# Patient Record
Sex: FEMALE | Race: WHITE | NOT HISPANIC OR LATINO | Employment: OTHER | URBAN - METROPOLITAN AREA
[De-identification: names, ages, dates, MRNs, and addresses within clinical notes are randomized per-mention and may not be internally consistent; named-entity substitution may affect disease eponyms.]

---

## 2017-02-02 ENCOUNTER — RECORDS - HEALTHEAST (OUTPATIENT)
Dept: LAB | Facility: CLINIC | Age: 62
End: 2017-02-02

## 2017-02-02 LAB
CHOLEST SERPL-MCNC: 296 MG/DL
FASTING STATUS PATIENT QL REPORTED: ABNORMAL
HDLC SERPL-MCNC: 65 MG/DL
LDLC SERPL CALC-MCNC: 184 MG/DL
TRIGL SERPL-MCNC: 235 MG/DL

## 2017-02-07 LAB
BKR LAB AP ABNORMAL BLEEDING: NO
BKR LAB AP BIRTH CONTROL/HORMONES: NORMAL
BKR LAB AP CERVICAL APPEARANCE: NORMAL
BKR LAB AP GYN ADEQUACY: NORMAL
BKR LAB AP GYN INTERPRETATION: NORMAL
BKR LAB AP GYN OTHER FINDINGS: NORMAL
BKR LAB AP HPV REFLEX: NORMAL
BKR LAB AP LMP: NORMAL
BKR LAB AP PATIENT STATUS: NORMAL
BKR LAB AP PREVIOUS ABNORMAL: NORMAL
BKR LAB AP PREVIOUS NORMAL: 2011
HIGH RISK?: NO
HPV INTERPRETATION - HISTORICAL: NORMAL
HPV INTERPRETER - HISTORICAL: NORMAL
PATH REPORT.COMMENTS IMP SPEC: NORMAL
RESULT FLAG (HE HISTORICAL CONVERSION): NORMAL

## 2017-10-03 ENCOUNTER — RECORDS - HEALTHEAST (OUTPATIENT)
Dept: LAB | Facility: CLINIC | Age: 62
End: 2017-10-03

## 2017-10-03 LAB
CHOLEST SERPL-MCNC: 290 MG/DL
FASTING STATUS PATIENT QL REPORTED: ABNORMAL
HDLC SERPL-MCNC: 69 MG/DL
LDLC SERPL CALC-MCNC: 181 MG/DL
TRIGL SERPL-MCNC: 199 MG/DL

## 2018-01-23 ENCOUNTER — RECORDS - HEALTHEAST (OUTPATIENT)
Dept: LAB | Facility: CLINIC | Age: 63
End: 2018-01-23

## 2018-01-23 LAB
ALBUMIN SERPL-MCNC: 3.9 G/DL (ref 3.5–5)
ALP SERPL-CCNC: 81 U/L (ref 45–120)
ALT SERPL W P-5'-P-CCNC: 49 U/L (ref 0–45)
ANION GAP SERPL CALCULATED.3IONS-SCNC: 8 MMOL/L (ref 5–18)
AST SERPL W P-5'-P-CCNC: 36 U/L (ref 0–40)
BILIRUB SERPL-MCNC: 0.8 MG/DL (ref 0–1)
BUN SERPL-MCNC: 14 MG/DL (ref 8–22)
CALCIUM SERPL-MCNC: 9.7 MG/DL (ref 8.5–10.5)
CHLORIDE BLD-SCNC: 104 MMOL/L (ref 98–107)
CHOLEST SERPL-MCNC: 222 MG/DL
CO2 SERPL-SCNC: 28 MMOL/L (ref 22–31)
CREAT SERPL-MCNC: 0.75 MG/DL (ref 0.6–1.1)
FASTING STATUS PATIENT QL REPORTED: ABNORMAL
GFR SERPL CREATININE-BSD FRML MDRD: >60 ML/MIN/1.73M2
GLUCOSE BLD-MCNC: 97 MG/DL (ref 70–125)
HDLC SERPL-MCNC: 76 MG/DL
LDLC SERPL CALC-MCNC: 117 MG/DL
POTASSIUM BLD-SCNC: 4.7 MMOL/L (ref 3.5–5)
PROT SERPL-MCNC: 7 G/DL (ref 6–8)
SODIUM SERPL-SCNC: 140 MMOL/L (ref 136–145)
TRIGL SERPL-MCNC: 147 MG/DL

## 2018-09-07 ENCOUNTER — RECORDS - HEALTHEAST (OUTPATIENT)
Dept: LAB | Facility: CLINIC | Age: 63
End: 2018-09-07

## 2018-09-07 LAB
ALBUMIN SERPL-MCNC: 3.8 G/DL (ref 3.5–5)
ALP SERPL-CCNC: 92 U/L (ref 45–120)
ALT SERPL W P-5'-P-CCNC: 39 U/L (ref 0–45)
ANION GAP SERPL CALCULATED.3IONS-SCNC: 8 MMOL/L (ref 5–18)
AST SERPL W P-5'-P-CCNC: 32 U/L (ref 0–40)
BILIRUB SERPL-MCNC: 0.6 MG/DL (ref 0–1)
BUN SERPL-MCNC: 16 MG/DL (ref 8–22)
CALCIUM SERPL-MCNC: 9.8 MG/DL (ref 8.5–10.5)
CHLORIDE BLD-SCNC: 105 MMOL/L (ref 98–107)
CHOLEST SERPL-MCNC: 179 MG/DL
CO2 SERPL-SCNC: 27 MMOL/L (ref 22–31)
CREAT SERPL-MCNC: 0.71 MG/DL (ref 0.6–1.1)
FASTING STATUS PATIENT QL REPORTED: NORMAL
GFR SERPL CREATININE-BSD FRML MDRD: >60 ML/MIN/1.73M2
GLUCOSE BLD-MCNC: 85 MG/DL (ref 70–125)
HDLC SERPL-MCNC: 74 MG/DL
LDLC SERPL CALC-MCNC: 91 MG/DL
POTASSIUM BLD-SCNC: 4.3 MMOL/L (ref 3.5–5)
PROT SERPL-MCNC: 7 G/DL (ref 6–8)
SODIUM SERPL-SCNC: 140 MMOL/L (ref 136–145)
TRIGL SERPL-MCNC: 68 MG/DL

## 2019-09-26 ENCOUNTER — RECORDS - HEALTHEAST (OUTPATIENT)
Dept: LAB | Facility: CLINIC | Age: 64
End: 2019-09-26

## 2019-09-26 LAB
ALBUMIN SERPL-MCNC: 4.1 G/DL (ref 3.5–5)
ALP SERPL-CCNC: 174 U/L (ref 45–120)
ALT SERPL W P-5'-P-CCNC: 128 U/L (ref 0–45)
ANION GAP SERPL CALCULATED.3IONS-SCNC: 9 MMOL/L (ref 5–18)
AST SERPL W P-5'-P-CCNC: 85 U/L (ref 0–40)
BILIRUB SERPL-MCNC: 0.9 MG/DL (ref 0–1)
BUN SERPL-MCNC: 15 MG/DL (ref 8–22)
CALCIUM SERPL-MCNC: 9.8 MG/DL (ref 8.5–10.5)
CHLORIDE BLD-SCNC: 105 MMOL/L (ref 98–107)
CHOLEST SERPL-MCNC: 194 MG/DL
CO2 SERPL-SCNC: 25 MMOL/L (ref 22–31)
CREAT SERPL-MCNC: 0.83 MG/DL (ref 0.6–1.1)
FASTING STATUS PATIENT QL REPORTED: NORMAL
GFR SERPL CREATININE-BSD FRML MDRD: >60 ML/MIN/1.73M2
GLUCOSE BLD-MCNC: 94 MG/DL (ref 70–125)
HDLC SERPL-MCNC: 83 MG/DL
LDLC SERPL CALC-MCNC: 92 MG/DL
POTASSIUM BLD-SCNC: 4.3 MMOL/L (ref 3.5–5)
PROT SERPL-MCNC: 7.4 G/DL (ref 6–8)
SODIUM SERPL-SCNC: 139 MMOL/L (ref 136–145)
TRIGL SERPL-MCNC: 95 MG/DL

## 2019-10-21 ENCOUNTER — RECORDS - HEALTHEAST (OUTPATIENT)
Dept: LAB | Facility: CLINIC | Age: 64
End: 2019-10-21

## 2019-10-21 LAB
ALBUMIN SERPL-MCNC: 4.1 G/DL (ref 3.5–5)
ALP SERPL-CCNC: 161 U/L (ref 45–120)
ALT SERPL W P-5'-P-CCNC: 98 U/L (ref 0–45)
ANION GAP SERPL CALCULATED.3IONS-SCNC: 10 MMOL/L (ref 5–18)
AST SERPL W P-5'-P-CCNC: 73 U/L (ref 0–40)
BILIRUB SERPL-MCNC: 0.4 MG/DL (ref 0–1)
BUN SERPL-MCNC: 14 MG/DL (ref 8–22)
CALCIUM SERPL-MCNC: 9.9 MG/DL (ref 8.5–10.5)
CHLORIDE BLD-SCNC: 100 MMOL/L (ref 98–107)
CO2 SERPL-SCNC: 25 MMOL/L (ref 22–31)
CREAT SERPL-MCNC: 0.91 MG/DL (ref 0.6–1.1)
GFR SERPL CREATININE-BSD FRML MDRD: >60 ML/MIN/1.73M2
GLUCOSE BLD-MCNC: 92 MG/DL (ref 70–125)
POTASSIUM BLD-SCNC: 4.5 MMOL/L (ref 3.5–5)
PROT SERPL-MCNC: 7.4 G/DL (ref 6–8)
SODIUM SERPL-SCNC: 135 MMOL/L (ref 136–145)

## 2019-10-23 LAB
HAV IGM SERPL QL IA: NEGATIVE
HBV CORE IGM SERPL QL IA: NEGATIVE
HBV SURFACE AG SERPL QL IA: NEGATIVE
HCV AB SERPL QL IA: NEGATIVE

## 2021-03-09 ENCOUNTER — RECORDS - HEALTHEAST (OUTPATIENT)
Dept: ADMINISTRATIVE | Facility: OTHER | Age: 66
End: 2021-03-09

## 2021-03-09 ENCOUNTER — AMBULATORY - HEALTHEAST (OUTPATIENT)
Dept: CARDIOLOGY | Facility: CLINIC | Age: 66
End: 2021-03-09

## 2021-03-10 ENCOUNTER — OFFICE VISIT - HEALTHEAST (OUTPATIENT)
Dept: CARDIOLOGY | Facility: CLINIC | Age: 66
End: 2021-03-10

## 2021-03-10 DIAGNOSIS — I48.92 PAROXYSMAL ATRIAL FLUTTER (H): ICD-10-CM

## 2021-03-10 LAB
ATRIAL RATE - MUSE: 57 BPM
DIASTOLIC BLOOD PRESSURE - MUSE: NORMAL
INTERPRETATION ECG - MUSE: NORMAL
P AXIS - MUSE: 52 DEGREES
PR INTERVAL - MUSE: 168 MS
QRS DURATION - MUSE: 90 MS
QT - MUSE: 446 MS
QTC - MUSE: 434 MS
R AXIS - MUSE: 2 DEGREES
SYSTOLIC BLOOD PRESSURE - MUSE: NORMAL
T AXIS - MUSE: 57 DEGREES
TSH SERPL DL<=0.005 MIU/L-ACNC: 1.24 UIU/ML (ref 0.3–5)
VENTRICULAR RATE- MUSE: 57 BPM

## 2021-03-10 RX ORDER — BUSPIRONE HYDROCHLORIDE 10 MG/1
TABLET ORAL
Status: SHIPPED | COMMUNITY
Start: 2020-12-21

## 2021-03-10 RX ORDER — ALPRAZOLAM 1 MG
TABLET ORAL
Status: SHIPPED | COMMUNITY
Start: 2021-02-22

## 2021-03-10 RX ORDER — ATORVASTATIN CALCIUM 20 MG/1
20 TABLET, FILM COATED ORAL DAILY
Status: SHIPPED | COMMUNITY
Start: 2021-01-03

## 2021-03-10 ASSESSMENT — MIFFLIN-ST. JEOR: SCORE: 1192.72

## 2021-03-16 ENCOUNTER — HOSPITAL ENCOUNTER (OUTPATIENT)
Dept: CARDIOLOGY | Facility: CLINIC | Age: 66
Discharge: HOME | End: 2021-03-16
Attending: INTERNAL MEDICINE

## 2021-03-16 DIAGNOSIS — I48.92 PAROXYSMAL ATRIAL FLUTTER (H): ICD-10-CM

## 2021-03-16 LAB
AORTIC ROOT: 3.7 CM
AR DECEL SLOPE: 1940 MM/S2
AR PEAK VELOCITY: 375 CM/S
ASCENDING AORTA: 3.7 CM
AV REGURGITANT PEAK GRADIENT: 56.3 MMHG
AV REGURGITATION PRESSURE HALF TIME: 670 MS
BSA FOR ECHO PROCEDURE: 1.73 M2
CV BLOOD PRESSURE: ABNORMAL MMHG
CV ECHO HEIGHT: 62 IN
CV ECHO WEIGHT: 152 LBS
DOP CALC LVOT AREA: 4.52 CM2
DOP CALC LVOT DIAMETER: 2.4 CM
DOP CALC LVOT PEAK VEL: 65.4 CM/S
DOP CALC LVOT STROKE VOLUME: 82.3 CM3
DOP CALCLVOT PEAK VEL VTI: 18.2 CM
EJECTION FRACTION: 61 % (ref 55–75)
FRACTIONAL SHORTENING: 38.7 % (ref 28–44)
INTERVENTRICULAR SEPTUM IN END DIASTOLE: 1.05 CM (ref 0.6–0.9)
IVS/PW RATIO: 1.3
LA AREA 1: 12.9 CM2
LA AREA 2: 12.5 CM2
LEFT ATRIUM LENGTH: 3.78 CM
LEFT ATRIUM SIZE: 2.9 CM
LEFT ATRIUM TO AORTIC ROOT RATIO: 0.78 NO UNITS
LEFT ATRIUM VOLUME INDEX: 21 ML/M2
LEFT ATRIUM VOLUME: 36.3 ML
LEFT VENTRICLE CARDIAC INDEX: 2.9 L/MIN/M2
LEFT VENTRICLE CARDIAC OUTPUT: 5.1 L/MIN
LEFT VENTRICLE DIASTOLIC VOLUME INDEX: 35.3 CM3/M2 (ref 29–61)
LEFT VENTRICLE DIASTOLIC VOLUME: 61 CM3 (ref 46–106)
LEFT VENTRICLE HEART RATE: 62 BPM
LEFT VENTRICLE MASS INDEX: 64.5 G/M2
LEFT VENTRICLE SYSTOLIC VOLUME INDEX: 13.9 CM3/M2 (ref 8–24)
LEFT VENTRICLE SYSTOLIC VOLUME: 24 CM3 (ref 14–42)
LEFT VENTRICULAR INTERNAL DIMENSION IN DIASTOLE: 3.93 CM (ref 3.8–5.2)
LEFT VENTRICULAR INTERNAL DIMENSION IN SYSTOLE: 2.41 CM (ref 2.2–3.5)
LEFT VENTRICULAR MASS: 111.5 G
LEFT VENTRICULAR OUTFLOW TRACT MEAN GRADIENT: 1 MMHG
LEFT VENTRICULAR OUTFLOW TRACT MEAN VELOCITY: 50.2 CM/S
LEFT VENTRICULAR OUTFLOW TRACT PEAK GRADIENT: 2 MMHG
LEFT VENTRICULAR POSTERIOR WALL IN END DIASTOLE: 0.81 CM (ref 0.6–0.9)
LV STROKE VOLUME INDEX: 47.6 ML/M2
MITRAL VALVE E/A RATIO: 0.9
MR PEAK GRADIENT: 13.8 MMHG
MV AVERAGE E/E' RATIO: 8.2 CM/S
MV DECELERATION TIME: 127 MS
MV E'TISSUE VEL-LAT: 7.02 CM/S
MV E'TISSUE VEL-MED: 5.56 CM/S
MV LATERAL E/E' RATIO: 7.4
MV MEDIAL E/E' RATIO: 9.3
MV PEAK A VELOCITY: 55.5 CM/S
MV PEAK E VELOCITY: 51.8 CM/S
NUC REST DIASTOLIC VOLUME INDEX: 2432 LBS
NUC REST SYSTOLIC VOLUME INDEX: 62 IN
PISA MR PEAK VEL: 186 CM/S
TRICUSPID REGURGITATION PEAK PRESSURE GRADIENT: 21 MMHG
TRICUSPID VALVE ANULAR PLANE SYSTOLIC EXCURSION: 2 CM
TRICUSPID VALVE PEAK REGURGITANT VELOCITY: 229 CM/S

## 2021-03-16 ASSESSMENT — MIFFLIN-ST. JEOR: SCORE: 1192.72

## 2021-04-01 ENCOUNTER — RECORDS - HEALTHEAST (OUTPATIENT)
Dept: LAB | Facility: CLINIC | Age: 66
End: 2021-04-01

## 2021-04-01 LAB
ALBUMIN SERPL-MCNC: 4 G/DL (ref 3.5–5)
ALP SERPL-CCNC: 120 U/L (ref 45–120)
ALT SERPL W P-5'-P-CCNC: 61 U/L (ref 0–45)
ANION GAP SERPL CALCULATED.3IONS-SCNC: 11 MMOL/L (ref 5–18)
AST SERPL W P-5'-P-CCNC: 37 U/L (ref 0–40)
BILIRUB SERPL-MCNC: 0.5 MG/DL (ref 0–1)
BUN SERPL-MCNC: 13 MG/DL (ref 8–22)
CALCIUM SERPL-MCNC: 9.5 MG/DL (ref 8.5–10.5)
CHLORIDE BLD-SCNC: 103 MMOL/L (ref 98–107)
CHOLEST SERPL-MCNC: 184 MG/DL
CO2 SERPL-SCNC: 24 MMOL/L (ref 22–31)
CREAT SERPL-MCNC: 0.77 MG/DL (ref 0.6–1.1)
FASTING STATUS PATIENT QL REPORTED: NORMAL
GFR SERPL CREATININE-BSD FRML MDRD: >60 ML/MIN/1.73M2
GLUCOSE BLD-MCNC: 102 MG/DL (ref 70–125)
HDLC SERPL-MCNC: 73 MG/DL
LDLC SERPL CALC-MCNC: 87 MG/DL
POTASSIUM BLD-SCNC: 4.3 MMOL/L (ref 3.5–5)
PROT SERPL-MCNC: 7.1 G/DL (ref 6–8)
SODIUM SERPL-SCNC: 138 MMOL/L (ref 136–145)
TRIGL SERPL-MCNC: 121 MG/DL

## 2021-05-25 ENCOUNTER — RECORDS - HEALTHEAST (OUTPATIENT)
Dept: ADMINISTRATIVE | Facility: CLINIC | Age: 66
End: 2021-05-25

## 2021-05-26 ENCOUNTER — RECORDS - HEALTHEAST (OUTPATIENT)
Dept: ADMINISTRATIVE | Facility: CLINIC | Age: 66
End: 2021-05-26

## 2021-05-28 ENCOUNTER — RECORDS - HEALTHEAST (OUTPATIENT)
Dept: ADMINISTRATIVE | Facility: CLINIC | Age: 66
End: 2021-05-28

## 2021-06-05 VITALS — BODY MASS INDEX: 27.97 KG/M2 | WEIGHT: 152 LBS | HEIGHT: 62 IN

## 2021-06-05 VITALS
DIASTOLIC BLOOD PRESSURE: 70 MMHG | WEIGHT: 152 LBS | HEART RATE: 68 BPM | BODY MASS INDEX: 27.97 KG/M2 | SYSTOLIC BLOOD PRESSURE: 102 MMHG | HEIGHT: 62 IN | RESPIRATION RATE: 14 BRPM

## 2021-06-15 NOTE — PATIENT INSTRUCTIONS - HE
1. We will check a TSH level to make sure your thyroid is okay today.  2. We will schedule an echocardiogram to look for evidence of structural heart disease.  3. Check in with Dr. Brown regarding your blood work and for routine follow-up.  4. Limit your alcohol intake to decrease the likelihood of recurrent episodes  5. Try to exercise regularly, targeting 150 minutes of weekly moderate aerobic activity as a goal

## 2021-06-16 PROBLEM — I48.92 PAROXYSMAL ATRIAL FLUTTER (H): Status: ACTIVE | Noted: 2021-03-10

## 2021-06-20 ENCOUNTER — HEALTH MAINTENANCE LETTER (OUTPATIENT)
Age: 66
End: 2021-06-20

## 2021-06-30 NOTE — PROGRESS NOTES
"Progress Notes by Dejuan Vasquez MD at 3/10/2021  1:50 PM     Author: Dejuan Vasquez MD Service: -- Author Type: Physician    Filed: 3/10/2021  3:57 PM Encounter Date: 3/10/2021 Status: Signed    : Dejuan Vasquez MD (Physician)         CARDIOLOGY CLINIC CONSULT NOTE     Assessment/Plan:   1.  Paroxysmal atrial flutter.  Symptomatic with rapid ventricular response.  Responded well to metoprolol in the urgency room.  Given her low resting heart rate and low blood pressure I would not advocate daily use of this medication at this time.  We discussed options of treatment including a \"pill in the pocket\" strategy, suppressive therapy, as well as the possibility of an ablation to prevent recurrence.  We will not embark on any of these approaches as this is her first episode.  We will check an echocardiogram looking for evidence of structural heart disease.  We will also check a TSH today.  She was advised to limit alcohol intake  2.  Sedentary lifestyle.  Advised 150 minutes of moderate weekly physical activities  3.  History of hyperlipidemia with high HDL on atorvastatin       History of Present Illness:     It is my pleasure to see Damon GOSS Mauriusha at the [facility] clinic for evaluation of paroxysmal atrial flutter.  She is accompanied by her partner Kaleb GOSS Jazielflo is a 65 y.o. female with a past medical history of hyperlipidemia on treatment, but no previous history of coronary artery disease, hypertension, or arrhythmias.  She is relatively sedentary, with limited activities because of knee pains, but has no problems climbing stairs with in her home.  At approximately 3 PM on 3/5, she noted sudden onset of her heart racing.  She had no chest pain or shortness of breath but was somewhat lightheaded.  Eventually she presented to the emergency room where she was found to be in atrial flutter with a rapid ventricular response.  She was given metoprolol with slowing " of her heart rate then she spontaneously returned to sinus rhythm.  She noted instant improvement in her symptoms with this medication.  She has been taking 325 mg of aspirin daily since that time.  She has not had any recurrent symptoms since that time never had any similar symptoms previously.    She quit smoking more than a decade ago.  She drinks 0 to 3 glasses of wine a day but had none the night before this episode occurred.  Her partner has chronic kidney disease and some limits her salt intake, but the patient adds salt at the table.  Blood pressures have been noted to be on the low side recently with systolics in the 90s to 100s.  She has been under increased stress recently with a son undergoing a difficult custody moreno.  Her weight has been stable.  Drinks approximately 1-2 cups of coffee daily.  She does take 1 organic supplement occasionally which does not appear to have caffeine or stimulants, reviewing the label.  She denies recreational drug use  [id number] score is 1.    Past Medical History:     Patient Active Problem List   Diagnosis   ? Paroxysmal atrial flutter (H)       Past Surgical History:   History reviewed. No pertinent surgical history.    Family History:     Family History   Problem Relation Age of Onset   ? Heart failure Mother 70     Family history reviewed and is not pertinent to the chief complaint or presenting problem    Social History:    reports that she quit smoking about 13 years ago. She has never used smokeless tobacco. She reports that she does not drink alcohol.   Quit smoking 2008  Currently drinks 0 to 3 glasses of wine a day    Exercise: Minimal.  No problems climbing stairs in her home    Sleep: Generally restorative without daytime sleepiness.  Does occasionally snore, with occasional hot flashes, but no apnea has been observed    Meds:     Current Outpatient Medications   Medication Sig Dispense Refill   ? ALPRAZolam (XANAX) 1 MG tablet As needed     ?  "atorvastatin (LIPITOR) 20 MG tablet Take 20 mg by mouth daily.      ? busPIRone (BUSPAR) 10 MG tablet TAKE 1 TABLET BY MOUTH TWICE DAILY FOR ANXIETY     ? FLUoxetine (PROZAC) 20 MG capsule 60 mg daily.        No current facility-administered medications for this visit.        Allergies:   Shellfish derived and Phenytoin sodium extended    Review of Systems:     General: WNL  Eyes: WNL  Ears/Nose/Throat: WNL  Lungs: WNL  Heart: WNL  Stomach: WNL  Bladder: WNL  Muscle/Joints: WNL  Skin: WNL  Nervous System: WNL  Mental Health: WNL     Blood: WNL        Objective:      Physical Exam  152 lb (68.9 kg)  5' 2\" (1.575 m)  Body mass index is 27.8 kg/m .  /70 (Patient Site: Right Arm, Patient Position: Sitting, Cuff Size: Adult Regular)   Pulse 68   Resp 14   Ht 5' 2\" (1.575 m)   Wt 152 lb (68.9 kg)   BMI 27.80 kg/m          General Appearance : Awake, Alert, No acute distress.  Anxious  HEENT: No Scleral icterus; the mucous membranes were pink and moist.  Conjunctivae not injected  Neck:  No cervical bruits, jugular venous distention, or thyromegaly   Chest: The spine was straight. Chest wall symmetric  Lungs: Respirations unlabored; the lungs are clear to auscultation.  No wheezing   Cardiovascular:  Normal point of maximal impulse.  Auscultation reveals normal first and second heart sounds with no murmurs, rubs, or gallops.  Carotid, radial, and dorsalis pedal pulses are intact and symmetric.  Abdomen: No organomegaly, masses, bruits, or tenderness. Bowels sounds are present  Extremities: No edema  Skin: No xanthelasma. Warm, Dry.  Musculoskeletal: No tenderness.  Neurologic: Alert and oriented ×3. Speech is fluent.      EKG (personally reviewed):  Today: Sinus bradycardia 57 bpm.  Otherwise normal ECG.    Cardiac Imaging Studies:      Lab Review   Lab Results   Component Value Date     (L) 10/21/2019    K 4.5 10/21/2019     10/21/2019    CO2 25 10/21/2019    BUN 14 10/21/2019    CREATININE 0.91 " 10/21/2019    CALCIUM 9.9 10/21/2019     No results found for: WBC, HGB, HCT, MCV, PLT  Lab Results   Component Value Date    CHOL 194 09/26/2019    TRIG 95 09/26/2019    HDL 83 09/26/2019    LDLCALC 92 09/26/2019     No results found for: TROPONINI  No results found for: BNP  No results found for: TSH    Dejuan Vasquez MD [facility]      This note created using Dragon voice recognition software. Sound alike errors may have escaped editing.

## 2021-07-13 ENCOUNTER — RECORDS - HEALTHEAST (OUTPATIENT)
Dept: ADMINISTRATIVE | Facility: CLINIC | Age: 66
End: 2021-07-13

## 2021-07-21 ENCOUNTER — RECORDS - HEALTHEAST (OUTPATIENT)
Dept: ADMINISTRATIVE | Facility: CLINIC | Age: 66
End: 2021-07-21

## 2021-10-11 ENCOUNTER — MYC MEDICAL ADVICE (OUTPATIENT)
Dept: CARDIOLOGY | Facility: CLINIC | Age: 66
End: 2021-10-11

## 2021-10-11 ENCOUNTER — HEALTH MAINTENANCE LETTER (OUTPATIENT)
Age: 66
End: 2021-10-11

## 2021-10-12 ENCOUNTER — TELEPHONE (OUTPATIENT)
Dept: CARDIOLOGY | Facility: CLINIC | Age: 66
End: 2021-10-12

## 2021-10-12 DIAGNOSIS — I48.92 PAROXYSMAL ATRIAL FLUTTER (H): Primary | ICD-10-CM

## 2021-10-12 NOTE — CONFIDENTIAL NOTE
Left detailed message with my direct number for patient to call with concerns. Message sent to scheduling to arrange follow up with Dr. Vasquez.

## 2021-10-28 NOTE — TELEPHONE ENCOUNTER
Left detailed message with my direct number to call with questions or concerns.  Follow up is planned 11/29/21.

## 2021-11-29 ENCOUNTER — OFFICE VISIT (OUTPATIENT)
Dept: CARDIOLOGY | Facility: CLINIC | Age: 66
End: 2021-11-29
Attending: INTERNAL MEDICINE
Payer: MEDICARE

## 2021-11-29 VITALS
HEART RATE: 72 BPM | BODY MASS INDEX: 27.64 KG/M2 | HEIGHT: 63 IN | RESPIRATION RATE: 16 BRPM | SYSTOLIC BLOOD PRESSURE: 106 MMHG | WEIGHT: 156 LBS | DIASTOLIC BLOOD PRESSURE: 66 MMHG

## 2021-11-29 DIAGNOSIS — I48.92 PAROXYSMAL ATRIAL FLUTTER (H): ICD-10-CM

## 2021-11-29 PROCEDURE — 99214 OFFICE O/P EST MOD 30 MIN: CPT | Performed by: INTERNAL MEDICINE

## 2021-11-29 RX ORDER — METOPROLOL TARTRATE 50 MG
1 TABLET ORAL PRN
COMMUNITY
End: 2023-06-30

## 2021-11-29 ASSESSMENT — MIFFLIN-ST. JEOR: SCORE: 1221.74

## 2021-11-29 NOTE — PATIENT INSTRUCTIONS
1. Check your pulse on a daily basis looking for asymptomatic episodes of atrial fibrillation/flutter  2. Decrease your alcohol intake, to decrease the frequency of these episodes  3. Target 150 minutes of moderate aerobic exercise each week.  4. Call if symptoms worsen or if you wish to pursue anticoagulant therapy.  Otherwise I plan to see back in 6 months.

## 2021-11-29 NOTE — PROGRESS NOTES
Cardiology Clinic Office Note    Assessment / Plan:    1.  Paroxysmal atrial flutter.  Discussed with her [id number] score of 2, she would likely benefit from long-term anticoagulation.  We discussed use of Xarelto once daily.  She is interested in a further trial of exercise, decreasing alcohol intake to see whether episodes are less frequent or frightening for her.  She will check her pulse on a daily basis to look for episodes of asymptomatic bouts.  We also discussed the potential for atrial flutter ablation to prevent recurrent episodes as well as use of antiarrhythmic agents to prevent recurrent episodes.  2.  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  Moderate alcohol intake plans to decrease her intake and increase her exercise.      Follow-up 6 months  ______________________________________________________________________    Subjective:    I had the opportunity to see Damon Rock at the [facility]. Damon Rock is a 66 year old female with a history of paroxysmal atrial flutter, with a single episode occurring 3/2021, for which she was prescribed as needed metoprolol which appeared to trigger prompt restoration of sinus rhythm with her episode.  She does have hyperlipidemia on treatment.  She returns today, accompanied by her partner Kaleb, who assists her in answering questions today.    Since I saw her last, she has had at  3 additional episodes of palpitations.  On each occasion she is taking 50 mg of metoprolol, and within a couple hours her rhythm is returned to normal.  These episodes remain frightening for her, but she is not particularly short of breath and notes only slight lightheadedness with them.  They have not occurred during sleep.  There is no obvious relationship to activities.  There is no associated chest pain.  She is immediately aware when they occur, and when they resolve within a couple of hours.  She has had  no prolonged episodes since March.    She still drinks 0 to 3 glasses of wine daily, and does not engage in any regular exercise.  She is unable to correlate alcohol intake with onset of the palpitations, but has not tried to limit her intake.    ______________________________________________________________________    Problem List:  Patient Active Problem List   Diagnosis     Paroxysmal atrial flutter (H)     Medical History:  No past medical history on file.  Surgical History:  No past surgical history on file.  Social History:  Social History     Socioeconomic History     Marital status: Single     Spouse name: Not on file     Number of children: Not on file     Years of education: Not on file     Highest education level: Not on file   Occupational History     Not on file   Tobacco Use     Smoking status: Former Smoker     Quit date: 2008     Years since quittin.9     Smokeless tobacco: Never Used   Substance and Sexual Activity     Alcohol use: Never     Drug use: Not on file     Sexual activity: Not on file   Other Topics Concern     Not on file   Social History Narrative     Not on file     Social Determinants of Health     Financial Resource Strain: Not on file   Food Insecurity: Not on file   Transportation Needs: Not on file   Physical Activity: Not on file   Stress: Not on file   Social Connections: Not on file   Intimate Partner Violence: Not on file   Housing Stability: Not on file     Sleep History:  Snores, but restorative.  No daytime sleepiness.  Exercise History:  Normal.    Review of Systems:        12 point review of systems otherwise negative        Family History:  Family History   Problem Relation Age of Onset     Heart Failure Mother 70.00         Allergies:  Allergies   Allergen Reactions     Shellfish Derived [Shellfish-Derived Products] Anaphylaxis     Phenytoin Sodium Extended [Phenytoin] Other (See Comments)     Medications:  Current Outpatient Medications   Medication Sig  "Dispense Refill     ALPRAZolam (XANAX) 1 MG tablet [ALPRAZOLAM (XANAX) 1 MG TABLET] As needed       atorvastatin (LIPITOR) 20 MG tablet [ATORVASTATIN (LIPITOR) 20 MG TABLET] Take 20 mg by mouth daily.        busPIRone (BUSPAR) 10 MG tablet [BUSPIRONE (BUSPAR) 10 MG TABLET] TAKE 1 TABLET BY MOUTH TWICE DAILY FOR ANXIETY       FLUoxetine (PROZAC) 20 MG capsule [FLUOXETINE (PROZAC) 20 MG CAPSULE] 60 mg daily.        metoprolol tartrate (LOPRESSOR) 50 MG tablet Take 1 tablet by mouth as needed       Multiple Vitamin (MULTI VITAMIN) TABS Take 1 tablet by mouth daily         Objective:   Wt Readings from Last 3 Encounters:   11/29/21 70.8 kg (156 lb)   03/10/21 68.9 kg (152 lb)     Vital signs:  Ht 1.6 m (5' 3\")   Wt 70.8 kg (156 lb)   BMI 27.63 kg/m        Physical Exam:    General Appearance : Awake, Alert, No acute distress anxious.  HEENT: No Scleral icterus; the mucous membranes were pink and moist.  Conjunctivae slightly injected  Neck:  No cervical bruits, jugular venous distention, or thyromegaly   Chest: The spine was straight. Chest wall symmetric  Lungs: Respirations unlabored; the lungs are clear to auscultation.  No wheezing   Cardiovascular:   Normal point of maximal impulse.  Auscultation reveals normal first and second heart sounds with no murmurs, rubs, or gallops.  Carotid, radial, and dorsalis pedal pulses are intact and symmetric.    Abdomen: No organomegaly, masses, bruits, or tenderness. Bowels sounds are present  Extremities:  No clubbing, cyanosis.  No edema  Skin: No rash, bruising  Musculoskeletal: No tenderness.  Neurologic: Alert and oriented ×3. Speech is fluent.    Lab Results:  LIPIDS:  Lab Results   Component Value Date    CHOL 184 04/01/2021    CHOL 194 09/26/2019    CHOL 179 09/07/2018     Lab Results   Component Value Date    HDL 73 04/01/2021    HDL 83 09/26/2019    HDL 74 09/07/2018     Lab Results   Component Value Date    LDL 87 04/01/2021    LDL 92 09/26/2019    LDL 91 09/07/2018 "     Lab Results   Component Value Date    TRIG 121 04/01/2021    TRIG 95 09/26/2019    TRIG 68 09/07/2018       Echocardiogram 3/2021:    Left Ventricle: Normal left ventricular size and systolic function.The calculated left ventricular ejection fraction is 61%. This represents a normal ejection fraction. The left ventricular wall thickness is normal. E/e' 8 to 15, which is equivocal   for estimating LV filling pressures.Left ventricular diastolic function is normal.    The left ventricular wall motion is normal.    Right Ventricle: Normal right ventricular size and systolic function. TAPSE is normal, which is consistent with normal right ventricular systolic function.    Tricuspid Valve: Normal tricuspid valve structure. There is no evidence of tricuspid stenosis. Trace tricuspid valve regurgitation. The estimated systolic pulmonary artery pressure is 21+ right atrial pressure mmhg.    Thoracic Aorta: The aortic root is mildly dilated. The ascending aorta is mildly dilated. 3.7cm            NEAL JOHNSON MD [facility]  [phone]    This note created using Dragon voice recognition software.  Sound alike errors may have escaped editing.

## 2021-11-29 NOTE — LETTER
11/29/2021    rAi Jesus MD  [facility] [address]  [place] [address]    RE: Damon Rock       Dear Colleague,    I had the pleasure of seeing Damon Rock in the [facility].    Cardiology Clinic Office Note    Assessment / Plan:    1.  Paroxysmal atrial flutter.  Discussed with her [id number] score of 2, she would likely benefit from long-term anticoagulation.  We discussed use of Xarelto once daily.  She is interested in a further trial of exercise, decreasing alcohol intake to see whether episodes are less frequent or frightening for her.  She will check her pulse on a daily basis to look for episodes of asymptomatic bouts.  We also discussed the potential for atrial flutter ablation to prevent recurrent episodes as well as use of antiarrhythmic agents to prevent recurrent episodes.  2.  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  Moderate alcohol intake plans to decrease her intake and increase her exercise.      Follow-up 6 months  ______________________________________________________________________    Subjective:    I had the opportunity to see Damon Rock at the [facility]. Damon Rock is a 66 year old female with a history of paroxysmal atrial flutter, with a single episode occurring 3/2021, for which she was prescribed as needed metoprolol which appeared to trigger prompt restoration of sinus rhythm with her episode.  She does have hyperlipidemia on treatment.  She returns today, accompanied by her partner Kaleb, who assists her in answering questions today.    Since I saw her last, she has had at  3 additional episodes of palpitations.  On each occasion she is taking 50 mg of metoprolol, and within a couple hours her rhythm is returned to normal.  These episodes remain frightening for her, but she is not  particularly short of breath and notes only slight lightheadedness with them.  They have not occurred during sleep.  There is no obvious relationship to activities.  There is no associated chest pain.  She is immediately aware when they occur, and when they resolve within a couple of hours.  She has had no prolonged episodes since March.    She still drinks 0 to 3 glasses of wine daily, and does not engage in any regular exercise.  She is unable to correlate alcohol intake with onset of the palpitations, but has not tried to limit her intake.    ______________________________________________________________________    Problem List:  Patient Active Problem List   Diagnosis     Paroxysmal atrial flutter (H)     Medical History:  No past medical history on file.  Surgical History:  No past surgical history on file.  Social History:  Social History     Socioeconomic History     Marital status: Single     Spouse name: Not on file     Number of children: Not on file     Years of education: Not on file     Highest education level: Not on file   Occupational History     Not on file   Tobacco Use     Smoking status: Former Smoker     Quit date: 2008     Years since quittin.9     Smokeless tobacco: Never Used   Substance and Sexual Activity     Alcohol use: Never     Drug use: Not on file     Sexual activity: Not on file   Other Topics Concern     Not on file   Social History Narrative     Not on file     Social Determinants of Health     Financial Resource Strain: Not on file   Food Insecurity: Not on file   Transportation Needs: Not on file   Physical Activity: Not on file   Stress: Not on file   Social Connections: Not on file   Intimate Partner Violence: Not on file   Housing Stability: Not on file     Sleep History:  Snores, but restorative.  No daytime sleepiness.  Exercise History:  Normal.    Review of Systems:        12 point review of systems otherwise negative        Family History:  Family History  "  Problem Relation Age of Onset     Heart Failure Mother 70.00         Allergies:  Allergies   Allergen Reactions     Shellfish Derived [Shellfish-Derived Products] Anaphylaxis     Phenytoin Sodium Extended [Phenytoin] Other (See Comments)     Medications:  Current Outpatient Medications   Medication Sig Dispense Refill     ALPRAZolam (XANAX) 1 MG tablet [ALPRAZOLAM (XANAX) 1 MG TABLET] As needed       atorvastatin (LIPITOR) 20 MG tablet [ATORVASTATIN (LIPITOR) 20 MG TABLET] Take 20 mg by mouth daily.        busPIRone (BUSPAR) 10 MG tablet [BUSPIRONE (BUSPAR) 10 MG TABLET] TAKE 1 TABLET BY MOUTH TWICE DAILY FOR ANXIETY       FLUoxetine (PROZAC) 20 MG capsule [FLUOXETINE (PROZAC) 20 MG CAPSULE] 60 mg daily.        metoprolol tartrate (LOPRESSOR) 50 MG tablet Take 1 tablet by mouth as needed       Multiple Vitamin (MULTI VITAMIN) TABS Take 1 tablet by mouth daily         Objective:   Wt Readings from Last 3 Encounters:   11/29/21 70.8 kg (156 lb)   03/10/21 68.9 kg (152 lb)     Vital signs:  Ht 1.6 m (5' 3\")   Wt 70.8 kg (156 lb)   BMI 27.63 kg/m        Physical Exam:    General Appearance : Awake, Alert, No acute distress anxious.  HEENT: No Scleral icterus; the mucous membranes were pink and moist.  Conjunctivae slightly injected  Neck:  No cervical bruits, jugular venous distention, or thyromegaly   Chest: The spine was straight. Chest wall symmetric  Lungs: Respirations unlabored; the lungs are clear to auscultation.  No wheezing   Cardiovascular:   Normal point of maximal impulse.  Auscultation reveals normal first and second heart sounds with no murmurs, rubs, or gallops.  Carotid, radial, and dorsalis pedal pulses are intact and symmetric.    Abdomen: No organomegaly, masses, bruits, or tenderness. Bowels sounds are present  Extremities:  No clubbing, cyanosis.  No edema  Skin: No rash, bruising  Musculoskeletal: No tenderness.  Neurologic: Alert and oriented ×3. Speech is fluent.    Lab " Results:  LIPIDS:  Lab Results   Component Value Date    CHOL 184 04/01/2021    CHOL 194 09/26/2019    CHOL 179 09/07/2018     Lab Results   Component Value Date    HDL 73 04/01/2021    HDL 83 09/26/2019    HDL 74 09/07/2018     Lab Results   Component Value Date    LDL 87 04/01/2021    LDL 92 09/26/2019    LDL 91 09/07/2018     Lab Results   Component Value Date    TRIG 121 04/01/2021    TRIG 95 09/26/2019    TRIG 68 09/07/2018       Echocardiogram 3/2021:    Left Ventricle: Normal left ventricular size and systolic function.The calculated left ventricular ejection fraction is 61%. This represents a normal ejection fraction. The left ventricular wall thickness is normal. E/e' 8 to 15, which is equivocal   for estimating LV filling pressures.Left ventricular diastolic function is normal.    The left ventricular wall motion is normal.    Right Ventricle: Normal right ventricular size and systolic function. TAPSE is normal, which is consistent with normal right ventricular systolic function.    Tricuspid Valve: Normal tricuspid valve structure. There is no evidence of tricuspid stenosis. Trace tricuspid valve regurgitation. The estimated systolic pulmonary artery pressure is 21+ right atrial pressure mmhg.    Thoracic Aorta: The aortic root is mildly dilated. The ascending aorta is mildly dilated. 3.7cm          This note created using Dragon voice recognition software.  Sound alike errors may have escaped editing.

## 2022-01-09 ENCOUNTER — LAB REQUISITION (OUTPATIENT)
Dept: LAB | Facility: CLINIC | Age: 67
End: 2022-01-09
Payer: MEDICARE

## 2022-01-09 DIAGNOSIS — Z03.818 ENCOUNTER FOR OBSERVATION FOR SUSPECTED EXPOSURE TO OTHER BIOLOGICAL AGENTS RULED OUT: ICD-10-CM

## 2022-01-09 PROCEDURE — U0005 INFEC AGEN DETEC AMPLI PROBE: HCPCS | Mod: ORL | Performed by: PHYSICIAN ASSISTANT

## 2022-01-10 LAB — SARS-COV-2 RNA RESP QL NAA+PROBE: NEGATIVE

## 2022-01-30 ENCOUNTER — HEALTH MAINTENANCE LETTER (OUTPATIENT)
Age: 67
End: 2022-01-30

## 2022-07-18 ENCOUNTER — LAB REQUISITION (OUTPATIENT)
Dept: LAB | Facility: CLINIC | Age: 67
End: 2022-07-18
Payer: MEDICARE

## 2022-07-18 DIAGNOSIS — I48.92 UNSPECIFIED ATRIAL FLUTTER (H): ICD-10-CM

## 2022-07-18 DIAGNOSIS — E78.2 MIXED HYPERLIPIDEMIA: ICD-10-CM

## 2022-07-18 PROCEDURE — 80061 LIPID PANEL: CPT | Mod: ORL | Performed by: FAMILY MEDICINE

## 2022-07-18 PROCEDURE — 84443 ASSAY THYROID STIM HORMONE: CPT | Mod: ORL | Performed by: FAMILY MEDICINE

## 2022-07-18 PROCEDURE — 80053 COMPREHEN METABOLIC PANEL: CPT | Mod: ORL | Performed by: FAMILY MEDICINE

## 2022-07-19 LAB
ALBUMIN SERPL BCG-MCNC: 4.4 G/DL (ref 3.5–5.2)
ALP SERPL-CCNC: 99 U/L (ref 35–104)
ALT SERPL W P-5'-P-CCNC: 31 U/L (ref 10–35)
ANION GAP SERPL CALCULATED.3IONS-SCNC: 12 MMOL/L (ref 7–15)
AST SERPL W P-5'-P-CCNC: 28 U/L (ref 10–35)
BILIRUB SERPL-MCNC: 0.3 MG/DL
BUN SERPL-MCNC: 9.5 MG/DL (ref 8–23)
CALCIUM SERPL-MCNC: 9.7 MG/DL (ref 8.8–10.2)
CHLORIDE SERPL-SCNC: 100 MMOL/L (ref 98–107)
CHOLEST SERPL-MCNC: 179 MG/DL
CREAT SERPL-MCNC: 0.81 MG/DL (ref 0.51–0.95)
DEPRECATED HCO3 PLAS-SCNC: 26 MMOL/L (ref 22–29)
GFR SERPL CREATININE-BSD FRML MDRD: 80 ML/MIN/1.73M2
GLUCOSE SERPL-MCNC: 97 MG/DL (ref 70–99)
HDLC SERPL-MCNC: 78 MG/DL
LDLC SERPL CALC-MCNC: 80 MG/DL
NONHDLC SERPL-MCNC: 101 MG/DL
POTASSIUM SERPL-SCNC: 4.8 MMOL/L (ref 3.4–5.3)
PROT SERPL-MCNC: 7.2 G/DL (ref 6.4–8.3)
SODIUM SERPL-SCNC: 138 MMOL/L (ref 136–145)
TRIGL SERPL-MCNC: 104 MG/DL
TSH SERPL DL<=0.005 MIU/L-ACNC: 1.12 UIU/ML (ref 0.3–4.2)

## 2022-07-26 ENCOUNTER — HOSPITAL ENCOUNTER (OUTPATIENT)
Dept: CARDIOLOGY | Facility: CLINIC | Age: 67
Discharge: HOME | End: 2022-07-26
Attending: FAMILY MEDICINE | Admitting: FAMILY MEDICINE
Payer: MEDICARE

## 2022-07-26 DIAGNOSIS — I77.810 DILATATION OF THORACIC AORTA (H): ICD-10-CM

## 2022-07-26 LAB — LVEF ECHO: NORMAL

## 2022-07-26 PROCEDURE — 93306 TTE W/DOPPLER COMPLETE: CPT

## 2022-07-26 PROCEDURE — 93306 TTE W/DOPPLER COMPLETE: CPT | Mod: 26 | Performed by: INTERNAL MEDICINE

## 2022-09-25 ENCOUNTER — HEALTH MAINTENANCE LETTER (OUTPATIENT)
Age: 67
End: 2022-09-25

## 2022-09-29 ENCOUNTER — OFFICE VISIT (OUTPATIENT)
Dept: CARDIOLOGY | Facility: CLINIC | Age: 67
End: 2022-09-29
Payer: MEDICARE

## 2022-09-29 VITALS
HEART RATE: 56 BPM | BODY MASS INDEX: 28.17 KG/M2 | RESPIRATION RATE: 16 BRPM | SYSTOLIC BLOOD PRESSURE: 110 MMHG | DIASTOLIC BLOOD PRESSURE: 70 MMHG | OXYGEN SATURATION: 96 % | WEIGHT: 159 LBS

## 2022-09-29 DIAGNOSIS — I48.92 PAROXYSMAL ATRIAL FLUTTER (H): Primary | ICD-10-CM

## 2022-09-29 PROCEDURE — 99213 OFFICE O/P EST LOW 20 MIN: CPT | Performed by: INTERNAL MEDICINE

## 2022-09-29 NOTE — LETTER
9/29/2022    Ari Jesus MD  [facility] [address] [place]  [place] [address]    RE: Damon Rock       Dear Colleague,     I had the pleasure of seeing Damon Rock in the [facility].    Cardiology Clinic Office Note    Assessment / Plan:    1.  Paroxysmal atrial flutter with likely rare recurrences properly terminated by metoprolol.  Discussed that if frequency increases anticoagulation may be warranted, or alternate approaches.  2.  Aortic root enlargement suggested on previous echo no longer evident certainly suggest that this is not progressive..      Follow-up as needed  ______________________________________________________________________    Subjective:    I had the opportunity to see Damon Rock at the [facility]. Damon Rock is a 66 year old female with a history of paroxysmal atrial flutter, with a single documented episode occurring 3/2021, for which metoprolol which appeared to trigger prompt restoration of sinus rhythm.  She has been prescribed as needed metoprolol since that time which she has taken for a few episodes. She does have hyperlipidemia on treatment.  She returns today, accompanied by her partner Kaleb..    Over the last year she has had only 2 episodes of palpitations, each promptly terminated after 1 dose of metoprolol.  She has become more active, riding her bicycle with good stamina.  This does not provoke symptoms.  She reports that she is kept her alcohol intake moderate.  Her weight is up 3 pounds versus a year ago.  She has had no lower extremity edema or paroxysmal nocturnal dyspnea or orthopnea.  She offers no complaints today, and feels well    ______________________________________________________________________    Problem List:  Patient Active Problem List   Diagnosis     Paroxysmal atrial flutter (H)     Medical History:  No past medical history on  file.  Surgical History:  No past surgical history on file.  Social History:  Social History     Socioeconomic History     Marital status: Single     Spouse name: Not on file     Number of children: Not on file     Years of education: Not on file     Highest education level: Not on file   Occupational History     Not on file   Tobacco Use     Smoking status: Former Smoker     Quit date: 2008     Years since quittin.7     Smokeless tobacco: Never Used   Substance and Sexual Activity     Alcohol use: Never     Drug use: Not on file     Sexual activity: Not on file   Other Topics Concern     Not on file   Social History Narrative     Not on file     Social Determinants of Health     Financial Resource Strain: Not on file   Food Insecurity: Not on file   Transportation Needs: Not on file   Physical Activity: Not on file   Stress: Not on file   Social Connections: Not on file   Intimate Partner Violence: Not on file   Housing Stability: Not on file     Sleep History:  Restorative  Exercise History:  Rides a bicycle    Review of Systems:      12 point review of systems otherwise negative     Please refer above for cardiac ROS details.         Family History:  Family History   Problem Relation Age of Onset     Heart Failure Mother 70.00         Allergies:  Allergies   Allergen Reactions     Shellfish Derived [Shellfish-Derived Products] Anaphylaxis     Phenytoin Sodium Extended [Phenytoin] Other (See Comments)     Medications:  Current Outpatient Medications   Medication Sig Dispense Refill     ALPRAZolam (XANAX) 1 MG tablet [ALPRAZOLAM (XANAX) 1 MG TABLET] As needed       atorvastatin (LIPITOR) 20 MG tablet [ATORVASTATIN (LIPITOR) 20 MG TABLET] Take 20 mg by mouth daily.        busPIRone (BUSPAR) 10 MG tablet [BUSPIRONE (BUSPAR) 10 MG TABLET] TAKE 1 TABLET BY MOUTH TWICE DAILY FOR ANXIETY       FLUoxetine (PROZAC) 20 MG capsule [FLUOXETINE (PROZAC) 20 MG CAPSULE] 60 mg daily.        metoprolol tartrate  (LOPRESSOR) 50 MG tablet Take 1 tablet by mouth as needed       Multiple Vitamin (MULTI VITAMIN) TABS Take 1 tablet by mouth daily (Patient not taking: Reported on 9/29/2022)         Objective:   Wt Readings from Last 3 Encounters:   09/29/22 72.1 kg (159 lb)   11/29/21 70.8 kg (156 lb)   03/10/21 68.9 kg (152 lb)     Vital signs:  /70 (BP Location: Left arm, Patient Position: Sitting, Cuff Size: Adult Regular)   Pulse 56   Resp 16   Wt 72.1 kg (159 lb)   SpO2 96%   BMI 28.17 kg/m        Physical Exam:    General Appearance : Awake, Alert, No acute distress.  Cheerful  HEENT: No Scleral icterus; the mucous membranes were pink and moist.  Conjunctivae not injected  Neck:  No cervical bruits, jugular venous distention, or thyromegaly   Chest: The spine was straight. Chest wall symmetric  Lungs: Respirations unlabored; the lungs are clear to auscultation.  No wheezing   Cardiovascular:   Normal point of maximal impulse.  Auscultation reveals normal first and second heart sounds with no murmurs, rubs, or gallops.  Carotid, radial, and dorsalis pedal pulses are intact and symmetric.  Abdomen: No organomegaly, masses, bruits, or tenderness. Bowels sounds are present  Extremities:  No clubbing, cyanosis.  No edema  Skin: No rash, bruising  Musculoskeletal: No tenderness.  Neurologic: Alert and oriented ×3. Speech is fluent.    Lab Results:  LIPIDS:  Lab Results   Component Value Date    CHOL 179 07/18/2022    CHOL 184 04/01/2021    CHOL 194 09/26/2019     Lab Results   Component Value Date    HDL 78 07/18/2022    HDL 73 04/01/2021    HDL 83 09/26/2019     Lab Results   Component Value Date    LDL 80 07/18/2022    LDL 87 04/01/2021    LDL 92 09/26/2019     Lab Results   Component Value Date    TRIG 104 07/18/2022    TRIG 121 04/01/2021    TRIG 95 09/26/2019       Echocardiogram 7/2022:  1.Left ventricular size, wall motion and function are normal. The ejection  fraction is 60-65%.  2.Normal right ventricle size  and systolic function.  3.The aortic valve is trileaflet. There is mild (1+) aortic regurgitation.  Decel 1/2 time is 528 msec.  4.Normal size ascending aorta.  There is no comparison study available.            NEAL JOHNSON MD [facility]  [phone]    This note created using Dragon voice recognition software.  Sound alike errors may have escaped editing.    Thank you for allowing me to participate in the care of your patient.      Sincerely,     Neal Johnson MD     [facility]  cc:   No referring provider defined for this encounter.

## 2022-09-29 NOTE — PATIENT INSTRUCTIONS
Continue using the metoprolol as needed  Call if episodes are more frequent, longer, or associated with more symptoms.  Great job singing today!  Enjoy your bicycle riding.

## 2022-09-29 NOTE — PROGRESS NOTES
Cardiology Clinic Office Note    Assessment / Plan:    1.  Paroxysmal atrial flutter with likely rare recurrences properly terminated by metoprolol.  Discussed that if frequency increases anticoagulation may be warranted, or alternate approaches.  2.  Aortic root enlargement suggested on previous echo no longer evident certainly suggest that this is not progressive..      Follow-up as needed  ______________________________________________________________________    Subjective:    I had the opportunity to see Damon Rock at the [facility]. Damon Rock is a 66 year old female with a history of paroxysmal atrial flutter, with a single documented episode occurring 3/2021, for which metoprolol which appeared to trigger prompt restoration of sinus rhythm.  She has been prescribed as needed metoprolol since that time which she has taken for a few episodes. She does have hyperlipidemia on treatment.  She returns today, accompanied by her partner Kaleb..    Over the last year she has had only 2 episodes of palpitations, each promptly terminated after 1 dose of metoprolol.  She has become more active, riding her bicycle with good stamina.  This does not provoke symptoms.  She reports that she is kept her alcohol intake moderate.  Her weight is up 3 pounds versus a year ago.  She has had no lower extremity edema or paroxysmal nocturnal dyspnea or orthopnea.  She offers no complaints today, and feels well    ______________________________________________________________________    Problem List:  Patient Active Problem List   Diagnosis     Paroxysmal atrial flutter (H)     Medical History:  No past medical history on file.  Surgical History:  No past surgical history on file.  Social History:  Social History     Socioeconomic History     Marital status: Single     Spouse name: Not on file     Number of children: Not on file     Years of education: Not on file     Highest  education level: Not on file   Occupational History     Not on file   Tobacco Use     Smoking status: Former Smoker     Quit date: 2008     Years since quittin.7     Smokeless tobacco: Never Used   Substance and Sexual Activity     Alcohol use: Never     Drug use: Not on file     Sexual activity: Not on file   Other Topics Concern     Not on file   Social History Narrative     Not on file     Social Determinants of Health     Financial Resource Strain: Not on file   Food Insecurity: Not on file   Transportation Needs: Not on file   Physical Activity: Not on file   Stress: Not on file   Social Connections: Not on file   Intimate Partner Violence: Not on file   Housing Stability: Not on file     Sleep History:  Restorative  Exercise History:  Rides a bicycle    Review of Systems:      12 point review of systems otherwise negative     Please refer above for cardiac ROS details.         Family History:  Family History   Problem Relation Age of Onset     Heart Failure Mother 70.00         Allergies:  Allergies   Allergen Reactions     Shellfish Derived [Shellfish-Derived Products] Anaphylaxis     Phenytoin Sodium Extended [Phenytoin] Other (See Comments)     Medications:  Current Outpatient Medications   Medication Sig Dispense Refill     ALPRAZolam (XANAX) 1 MG tablet [ALPRAZOLAM (XANAX) 1 MG TABLET] As needed       atorvastatin (LIPITOR) 20 MG tablet [ATORVASTATIN (LIPITOR) 20 MG TABLET] Take 20 mg by mouth daily.        busPIRone (BUSPAR) 10 MG tablet [BUSPIRONE (BUSPAR) 10 MG TABLET] TAKE 1 TABLET BY MOUTH TWICE DAILY FOR ANXIETY       FLUoxetine (PROZAC) 20 MG capsule [FLUOXETINE (PROZAC) 20 MG CAPSULE] 60 mg daily.        metoprolol tartrate (LOPRESSOR) 50 MG tablet Take 1 tablet by mouth as needed       Multiple Vitamin (MULTI VITAMIN) TABS Take 1 tablet by mouth daily (Patient not taking: Reported on 2022)         Objective:   Wt Readings from Last 3 Encounters:   22 72.1 kg (159 lb)    11/29/21 70.8 kg (156 lb)   03/10/21 68.9 kg (152 lb)     Vital signs:  /70 (BP Location: Left arm, Patient Position: Sitting, Cuff Size: Adult Regular)   Pulse 56   Resp 16   Wt 72.1 kg (159 lb)   SpO2 96%   BMI 28.17 kg/m        Physical Exam:    General Appearance : Awake, Alert, No acute distress.  Cheerful  HEENT: No Scleral icterus; the mucous membranes were pink and moist.  Conjunctivae not injected  Neck:  No cervical bruits, jugular venous distention, or thyromegaly   Chest: The spine was straight. Chest wall symmetric  Lungs: Respirations unlabored; the lungs are clear to auscultation.  No wheezing   Cardiovascular:   Normal point of maximal impulse.  Auscultation reveals normal first and second heart sounds with no murmurs, rubs, or gallops.  Carotid, radial, and dorsalis pedal pulses are intact and symmetric.  Abdomen: No organomegaly, masses, bruits, or tenderness. Bowels sounds are present  Extremities:  No clubbing, cyanosis.  No edema  Skin: No rash, bruising  Musculoskeletal: No tenderness.  Neurologic: Alert and oriented ×3. Speech is fluent.    Lab Results:  LIPIDS:  Lab Results   Component Value Date    CHOL 179 07/18/2022    CHOL 184 04/01/2021    CHOL 194 09/26/2019     Lab Results   Component Value Date    HDL 78 07/18/2022    HDL 73 04/01/2021    HDL 83 09/26/2019     Lab Results   Component Value Date    LDL 80 07/18/2022    LDL 87 04/01/2021    LDL 92 09/26/2019     Lab Results   Component Value Date    TRIG 104 07/18/2022    TRIG 121 04/01/2021    TRIG 95 09/26/2019       Echocardiogram 7/2022:  1.Left ventricular size, wall motion and function are normal. The ejection  fraction is 60-65%.  2.Normal right ventricle size and systolic function.  3.The aortic valve is trileaflet. There is mild (1+) aortic regurgitation.  Decel 1/2 time is 528 msec.  4.Normal size ascending aorta.  There is no comparison study available.            NEAL JOHNSON MD [facility]  [phone]    This  note created using Dragon voice recognition software.  Sound alike errors may have escaped editing.

## 2022-12-15 ENCOUNTER — TRANSFERRED RECORDS (OUTPATIENT)
Dept: HEALTH INFORMATION MANAGEMENT | Facility: CLINIC | Age: 67
End: 2022-12-15

## 2023-01-05 ENCOUNTER — TRANSCRIBE ORDERS (OUTPATIENT)
Dept: OTHER | Age: 68
End: 2023-01-05

## 2023-01-05 ENCOUNTER — MEDICAL CORRESPONDENCE (OUTPATIENT)
Dept: HEALTH INFORMATION MANAGEMENT | Facility: CLINIC | Age: 68
End: 2023-01-05

## 2023-01-05 DIAGNOSIS — R25.1 TREMOR: Primary | ICD-10-CM

## 2023-01-18 ENCOUNTER — OFFICE VISIT (OUTPATIENT)
Dept: NEUROLOGY | Facility: CLINIC | Age: 68
End: 2023-01-18
Attending: FAMILY MEDICINE
Payer: MEDICARE

## 2023-01-18 ENCOUNTER — LAB (OUTPATIENT)
Dept: LAB | Facility: CLINIC | Age: 68
End: 2023-01-18
Payer: MEDICARE

## 2023-01-18 VITALS — SYSTOLIC BLOOD PRESSURE: 94 MMHG | HEART RATE: 52 BPM | DIASTOLIC BLOOD PRESSURE: 61 MMHG

## 2023-01-18 DIAGNOSIS — G25.0 ESSENTIAL TREMOR: Primary | ICD-10-CM

## 2023-01-18 DIAGNOSIS — R25.1 TREMOR: ICD-10-CM

## 2023-01-18 DIAGNOSIS — R79.9 ABNORMAL FINDING OF BLOOD CHEMISTRY, UNSPECIFIED: ICD-10-CM

## 2023-01-18 LAB
ALBUMIN SERPL-MCNC: 4 G/DL (ref 3.5–5)
ALP SERPL-CCNC: 112 U/L (ref 45–120)
ALT SERPL W P-5'-P-CCNC: 39 U/L (ref 0–45)
ANION GAP SERPL CALCULATED.3IONS-SCNC: 9 MMOL/L (ref 5–18)
AST SERPL W P-5'-P-CCNC: 29 U/L (ref 0–40)
BILIRUB SERPL-MCNC: 0.7 MG/DL (ref 0–1)
BUN SERPL-MCNC: 14 MG/DL (ref 8–22)
CALCIUM SERPL-MCNC: 9.3 MG/DL (ref 8.5–10.5)
CHLORIDE BLD-SCNC: 103 MMOL/L (ref 98–107)
CO2 SERPL-SCNC: 27 MMOL/L (ref 22–31)
CREAT SERPL-MCNC: 0.86 MG/DL (ref 0.6–1.1)
GFR SERPL CREATININE-BSD FRML MDRD: 74 ML/MIN/1.73M2
GLUCOSE BLD-MCNC: 88 MG/DL (ref 70–125)
HBA1C MFR BLD: 5.4 %
POTASSIUM BLD-SCNC: 4.3 MMOL/L (ref 3.5–5)
PROT SERPL-MCNC: 7.4 G/DL (ref 6–8)
SODIUM SERPL-SCNC: 139 MMOL/L (ref 136–145)

## 2023-01-18 PROCEDURE — 99205 OFFICE O/P NEW HI 60 MIN: CPT | Mod: GC | Performed by: PSYCHIATRY & NEUROLOGY

## 2023-01-18 PROCEDURE — 83036 HEMOGLOBIN GLYCOSYLATED A1C: CPT

## 2023-01-18 PROCEDURE — 80053 COMPREHEN METABOLIC PANEL: CPT

## 2023-01-18 PROCEDURE — 36415 COLL VENOUS BLD VENIPUNCTURE: CPT

## 2023-01-18 RX ORDER — METOPROLOL SUCCINATE 25 MG/1
TABLET, EXTENDED RELEASE ORAL
Status: ON HOLD | COMMUNITY
Start: 2022-12-15 | End: 2023-09-26

## 2023-01-18 NOTE — NURSING NOTE
Chief Complaint   Patient presents with     Tremors     Referred by LANI Metzger on 1/18/2023 at 7:54 AM

## 2023-01-18 NOTE — PATIENT INSTRUCTIONS
Thanks for visiting the clinic today. We had an opportunity to discussed your Essential Tremor and talked about the plan as below.     Occupational therapy referral   Discussed the use of weighted gloves   We decided not to prescribe a medication at the moment, and deferred this to a later date.   We also discussed the Abram-Trio device. It is to be worn on the wrist, and we can discuss at the next visit. ([employer])  We also discussed that the metoprolol is not effective for you tremor, and therefore can be stopped as long as it is not being used for your heart.   Follow up in clinic in 6 months     The Abram Trio wrist product is a device worn around the wrist. It works by stimulating the radial and median nerves to disrupt the signals from the brain causing your tremor. A stimulation session lasts 40 minutes and can be done multiple times throughout the day. On average after a stimulation session people noticed a reduction of tremor for 90 minutes. The studies show that 64% of people had a meaningful reduction of tremor with the Abram Trio device and 68% of people were better able to carry out activities of daily living (writing, drinking/eating without spilling etc). The most common side effects was mild to moderate skin irritation (electrical stimulation burns, redness or itching).     Medicare now has coverage for Abram Trio. [employer] will verify benefits and cost. Below is the cost without insurance:       $1995.00 (includes everything - taxes) + $99 per month band subscription     Over 12 months can do $265.25  interest free payments - includes the band subscription     2 year warranty on watch and docking station     60 day free return policy (excludes those with Medicare coverage)    Band is requires replacement every 90 days     3 sizes of bands     Bands are mailed every 90 days       [email or web address]   2-391-124-2201   60 day free trial (excludes those with  Medicare coverage)    Contraindications to Abram Trio Therapy   - Patients with an implanted electrical medical device, such as a pacemaker, defibrillator, or deep brain stimulator or by patients with implanted metal   in the wrist (may cause electric shock, burns, electrical interference, or death.)   -Patients that have suspected or diagnosed epilepsy or other seizure disorder.   -Patients who are pregnant.   -On swollen, infected, inflamed areas, or skin eruptions, open wounds, or cancerous lesions

## 2023-01-18 NOTE — PROGRESS NOTES
Patient: Damon Rock   MRN: [id number]   : 1955   Date of Visit: 2023    Dear Colleague,     Thank you for referring your patient, Ms. Rock, to the [facility] at [facility]. Please see a copy of my visit note below.    Referring Provider: Ari Jesus MD    History of Present Illness  67-year-old right handed female retired  presented to clinic today with complaint of at least 6 months of bilateral upper extremity kinetic tremor.     Patient tells me that she has had onset of a right greater than left tremor that extends back a few years.  She does not member the exact date in which these tremors began.  She also describes that 6 months ago these tremors worsened and started to include a tremor of the head.  During this time she is found that she is unable to consume caffeinated drinks as it exacerbates the tremor.  Tremor improves with alcohol consumption.  She denies beginning any new medications preceding onset of tremor.  Notably, she is on fluoxetine, as well as buspirone for psychiatric illness.  Her primary care physician started her on daily metoprolol succinate to attempt to alleviate the tremor without any success.    To my eye, she appears to have a tremor that is absent at rest, but most present with postural and intentional activities involving the right upper extremity.  It is much less noticeable in the left upper extremity.  She has no tremor of the bilateral lower extremities.  While performing extraocular movements, yes no style head tremor does emerge that is noticeable.    Of note, she denies chronic constipation, vivid dreams or sleepwalking, falls, ataxia, change in the character of her voice, diplopia, generalized weakness, torticollis, or any cognitive changes during this time.  She does go on to explain that these tremors have caused some noticeable changes in her day-to-day life, she  has difficulty eating soup with a spoon, point water and coffee, and feels that she is in to make some adjustments and other recreational activities.  She would not qualify the tremor as disabling at this time.    I noted that she was rather hypotensive today, with a systolic blood pressure of 94 and a diastolic of 61.  Pulse was 52.      Current treatment for tremors: Metoprolol succinate 25 mg daily  Previous treatment for tremors: none    Exacerbating factors:  Stress, caffeine  Relieving factors:  Alcohol    Does alcohol relieve symptoms:  Yes  Caffeine intake: Exacerbating  Change in handwriting:  Yes  Resting tremor: No.  Bradykinesia:  No  Rigidity:  No  Gait problem:  No  Balance/Falls: None  Numbness/Tingling:  None  Dystonia:  No  Pain: Arthritis in hands and knees with associated pain.     Memory problems: No  Mood issues: She has BRYANNA and has been stable for years on alprazolam, fluoxetine and buspirone.   History of dopamine depleting therapies: No  Sleep issues/Dream enactment: None  Sense of smell: None  Constipation: None  Speech: None  Facial expression: No changes  Swallowing: No dysphagia  Autonomic dysfunction: None  Family Hx of tremor: Family history of tremor in brother, possibly father.      Movement Disorder-related Medications                    a.m.  p.m.      Metoprolol succinate  50 mg       Fluoxetine   60 mg      Buspirone   10 mg                Review of Systems:  Other than that mentioned above, the remainder of 12 systems reviewed were negative.    PMH: Hyperlipidemia, major depression, anxiety  PSH: Endometrial ablation  FH: Heart failure, tremor  SH: Retired  of 30 years  -Parents/Family/Living situation: Lives independently with partner  -Work: Retired  -Tobacco: None  -Alcohol: Occasional  -Illicit substances: None    Medications:  Current Outpatient Medications   Medication Sig Dispense Refill     ALPRAZolam (XANAX) 1 MG tablet [ALPRAZOLAM (XANAX) 1 MG TABLET] As  needed       atorvastatin (LIPITOR) 20 MG tablet [ATORVASTATIN (LIPITOR) 20 MG TABLET] Take 20 mg by mouth daily.        busPIRone (BUSPAR) 10 MG tablet [BUSPIRONE (BUSPAR) 10 MG TABLET] TAKE 1 TABLET BY MOUTH TWICE DAILY FOR ANXIETY       cholecalciferol (VITAMIN D3) 125 mcg (5000 units) capsule Take 125 mcg by mouth daily       FLUoxetine (PROZAC) 20 MG capsule Take 20 mg by mouth daily       metoprolol succinate ER (TOPROL XL) 25 MG 24 hr tablet TAKE 1 TABLET BY MOUTH EVERY DAY FOR TREMORS       metoprolol tartrate (LOPRESSOR) 50 MG tablet Take 1 tablet by mouth as needed             Allergies   Allergen Reactions     Shellfish Derived [Shellfish-Derived Products] Anaphylaxis     Phenytoin Sodium Extended [Phenytoin] Other (See Comments)         Physical Exam:  The patient's  blood pressure is 94/61 and her pulse is 52.      Tremor examination: 6 Hz 1 cm amplitude postural intention tremor in the right upper extremity.  Similar character but with reduced amplitude in the left upper extremity.  No tremor the bilateral lower extremities.  Small amplitude yes no head tremor noted.  No bradykinesia, no rigidity, no resting tremor, no ataxia or dysmetria with finger-nose-finger or tandem gait, appropriate arm swing with gait testing.    Data Reviewed: I have personally reviewed the tests/studies below.       Impression:  Damon Rock is a 67 year old right handed female with atrial flutter, hyperlipidemia, depression, anxiety who presents to clinic with at least 6 months of bilateral upper extremity kinetic tremor right greater than left.    Patient's examination features right greater than left kinetic tremor of the upper extremities also associated with a head tremor.  No parkinsonian features present.  No features concerning for dystonia.  Positive family history of tremor in brother, possibly father.  Exacerbated by caffeine, improved with alcohol.  Most consistent with essential tremor at this  time.    Therapeutically, typical medications used for ET may present some complications in this patient.  Existing prescription for metoprolol has caused patient to be somewhat bradycardic and hypotensive in clinic today.  Also not really providing any relief.  At this time, until metoprolol has been discontinued, propranolol may not be an appropriate therapy.  Given the patient's history of depression, topiramate, gabapentin, primidone may also not be optimal therapy.  Patient also explains that she does not find her symptoms to be disabling at this time, however it does affect her eating and other recreational activities.  We deferred pharmacologic therapy for the time being.  If not indicated from a cardiac standpoint, okay to discontinue metoprolol.    Discussion with the patient regarding other therapeutic options, we also discussed occupational therapy, weighted gloves as well as the Abram Trio device.  Patient noted to have a 16.5 cm right wrist, as measured in clinic today.  We discussed that the Abram Trio would be covered by some forms of Medicare and encouraged her to reach out to her insurance to discuss details with the company.  We also ordered a hemoglobin A1c and a CMP  for further work-up of tremor.    At this time we will proceed with occupational therapy and nonpharmacologic management and discussed Abram Trio and possibly pharmacologic therapy at the next visit. Patient shared that her tremors do not interfere with most activities and she is not interested in starting a medication to treat tremor. She is interested in the Abram Trio and will contact the clinic if she would like to proceed with this option and we will send a prescription. We briefly touched on focused ultrasound and deep brain stimulation and described that these therapies may be more useful down the road.     Plan:   -Occupational Therapy  -Weighted gloves  -Discussed Abram Trio (16.5 cm right wrist measured)  -Okay to discontinue  metoprolol from movement disorder standpoint    Patient to return in 6 months, for in-person or virtual visit, 30 minutes.     Dr. Sami Vaca         I, Ofelia Batista DO, personally saw this patient with the Neurology resident and agree with Dr. Vaca's findings and plan of care as documented in Dr. Vaca's note. I personally performed salient aspects of the history and neurological examination. Edits are made in red above.     I personally reviewed the medications and labs. I personally viewed the imaging, and agree with the interpretation documented by the resident.    Date of Service (when I saw the patient): 01/18/23    Time spent with patient: 45 minutes  60 minutes spent on date of encounter doing chart reviews and exam and documentation and further activities as noted above.     Ofelia Batista DO, MA   of Neurology   [facility]

## 2023-01-25 ENCOUNTER — MYC MEDICAL ADVICE (OUTPATIENT)
Dept: NEUROLOGY | Facility: CLINIC | Age: 68
End: 2023-01-25
Payer: MEDICARE

## 2023-04-26 ENCOUNTER — TRANSFERRED RECORDS (OUTPATIENT)
Dept: HEALTH INFORMATION MANAGEMENT | Facility: CLINIC | Age: 68
End: 2023-04-26

## 2023-05-04 ENCOUNTER — TRANSFERRED RECORDS (OUTPATIENT)
Dept: HEALTH INFORMATION MANAGEMENT | Facility: CLINIC | Age: 68
End: 2023-05-04

## 2023-05-04 ENCOUNTER — LAB REQUISITION (OUTPATIENT)
Dept: LAB | Facility: CLINIC | Age: 68
End: 2023-05-04
Payer: MEDICARE

## 2023-05-04 DIAGNOSIS — R19.7 DIARRHEA, UNSPECIFIED: ICD-10-CM

## 2023-05-04 PROCEDURE — 87506 IADNA-DNA/RNA PROBE TQ 6-11: CPT | Mod: ORL | Performed by: FAMILY MEDICINE

## 2023-05-04 PROCEDURE — 87209 SMEAR COMPLEX STAIN: CPT | Mod: ORL | Performed by: FAMILY MEDICINE

## 2023-05-04 PROCEDURE — 87493 C DIFF AMPLIFIED PROBE: CPT | Mod: ORL | Performed by: FAMILY MEDICINE

## 2023-05-05 LAB
C COLI+JEJUNI+LARI FUSA STL QL NAA+PROBE: NOT DETECTED
C DIFF TOX B STL QL: NEGATIVE
EC STX1 GENE STL QL NAA+PROBE: NOT DETECTED
EC STX2 GENE STL QL NAA+PROBE: NOT DETECTED
NOROV GI+II ORF1-ORF2 JNC STL QL NAA+PR: NOT DETECTED
O+P STL MICRO: NEGATIVE
RVA NSP5 STL QL NAA+PROBE: NOT DETECTED
SALMONELLA SP RPOD STL QL NAA+PROBE: NOT DETECTED
SHIGELLA SP+EIEC IPAH STL QL NAA+PROBE: NOT DETECTED
V CHOL+PARA RFBL+TRKH+TNAA STL QL NAA+PR: NOT DETECTED
Y ENTERO RECN STL QL NAA+PROBE: NOT DETECTED

## 2023-06-26 ENCOUNTER — TELEPHONE (OUTPATIENT)
Dept: NEUROLOGY | Facility: CLINIC | Age: 68
End: 2023-06-26
Payer: MEDICARE

## 2023-06-26 NOTE — TELEPHONE ENCOUNTER
[facility] Call Center    Phone Message    May a detailed message be left on voicemail: yes     Reason for Call: Other: Patient requests a call back to discuss a possibile provider change.  Patient is not wanting to wait until 8/23/23 for her appt;     Action Taken: Message routed to:  [facility]    Travel Screening: Not Applicable

## 2023-06-26 NOTE — TELEPHONE ENCOUNTER
Can you please contact the pt and help assist to see who she can get in sooner to see?    Thank you.      LANI Bustamante on 6/26/2023 at 2:42 PM

## 2023-06-26 NOTE — TELEPHONE ENCOUNTER
Call attempted to patient. Unable to leave message (no VM set up).     Upon call back please let patient know that no sooner appointments available with other providers. Next appointment with Dr. Batista is scheduled for 8/31/2023.    Yovani Benson RN, BSN  [facility]

## 2023-06-27 NOTE — TELEPHONE ENCOUNTER
Upon chart review, patient has been scheduled earlier (as they requested) to see Dr. Batista on 6/30 at [facility].    Yovani Benson RN, BSN  [facility]

## 2023-06-30 ENCOUNTER — APPOINTMENT (OUTPATIENT)
Dept: GENERAL RADIOLOGY | Facility: CLINIC | Age: 68
End: 2023-06-30
Attending: FAMILY MEDICINE
Payer: MEDICARE

## 2023-06-30 ENCOUNTER — APPOINTMENT (OUTPATIENT)
Dept: ULTRASOUND IMAGING | Facility: CLINIC | Age: 68
End: 2023-06-30
Attending: FAMILY MEDICINE
Payer: MEDICARE

## 2023-06-30 ENCOUNTER — HOSPITAL ENCOUNTER (EMERGENCY)
Facility: CLINIC | Age: 68
Discharge: HOME | End: 2023-06-30
Attending: FAMILY MEDICINE | Admitting: FAMILY MEDICINE
Payer: MEDICARE

## 2023-06-30 ENCOUNTER — OFFICE VISIT (OUTPATIENT)
Dept: NEUROLOGY | Facility: CLINIC | Age: 68
End: 2023-06-30
Payer: MEDICARE

## 2023-06-30 ENCOUNTER — DOCUMENTATION ONLY (OUTPATIENT)
Dept: ONCOLOGY | Facility: CLINIC | Age: 68
End: 2023-06-30

## 2023-06-30 VITALS
RESPIRATION RATE: 19 BRPM | SYSTOLIC BLOOD PRESSURE: 145 MMHG | DIASTOLIC BLOOD PRESSURE: 92 MMHG | HEIGHT: 63 IN | TEMPERATURE: 97.9 F | WEIGHT: 157 LBS | BODY MASS INDEX: 27.82 KG/M2 | HEART RATE: 61 BPM | OXYGEN SATURATION: 98 %

## 2023-06-30 VITALS
DIASTOLIC BLOOD PRESSURE: 94 MMHG | SYSTOLIC BLOOD PRESSURE: 147 MMHG | HEART RATE: 82 BPM | BODY MASS INDEX: 27.81 KG/M2 | OXYGEN SATURATION: 94 % | WEIGHT: 157 LBS

## 2023-06-30 DIAGNOSIS — R19.7 DIARRHEA, UNSPECIFIED TYPE: ICD-10-CM

## 2023-06-30 DIAGNOSIS — I48.92 PAROXYSMAL ATRIAL FLUTTER (H): ICD-10-CM

## 2023-06-30 DIAGNOSIS — G25.0 ESSENTIAL TREMOR: Primary | ICD-10-CM

## 2023-06-30 DIAGNOSIS — I47.19 AVNRT (AV NODAL RE-ENTRY TACHYCARDIA) (H): ICD-10-CM

## 2023-06-30 DIAGNOSIS — R25.1 TREMOR: ICD-10-CM

## 2023-06-30 DIAGNOSIS — E86.0 DEHYDRATION: ICD-10-CM

## 2023-06-30 LAB
ALBUMIN SERPL BCG-MCNC: 4.3 G/DL (ref 3.5–5.2)
ALBUMIN UR-MCNC: 10 MG/DL
ALP SERPL-CCNC: 94 U/L (ref 35–104)
ALT SERPL W P-5'-P-CCNC: 23 U/L (ref 0–50)
ANION GAP SERPL CALCULATED.3IONS-SCNC: 15 MMOL/L (ref 7–15)
APPEARANCE UR: CLEAR
APTT PPP: 25 SECONDS (ref 22–38)
AST SERPL W P-5'-P-CCNC: 24 U/L (ref 0–45)
BACTERIA #/AREA URNS HPF: ABNORMAL /HPF
BASOPHILS # BLD AUTO: 0 10E3/UL (ref 0–0.2)
BASOPHILS NFR BLD AUTO: 0 %
BILIRUB SERPL-MCNC: 0.4 MG/DL
BILIRUB UR QL STRIP: NEGATIVE
BUN SERPL-MCNC: 8.4 MG/DL (ref 8–23)
CALCIUM SERPL-MCNC: 9.3 MG/DL (ref 8.8–10.2)
CHLORIDE SERPL-SCNC: 101 MMOL/L (ref 98–107)
COLOR UR AUTO: ABNORMAL
CREAT SERPL-MCNC: 0.8 MG/DL (ref 0.51–0.95)
DEPRECATED HCO3 PLAS-SCNC: 21 MMOL/L (ref 22–29)
EOSINOPHIL # BLD AUTO: 0 10E3/UL (ref 0–0.7)
EOSINOPHIL NFR BLD AUTO: 0 %
ERYTHROCYTE [DISTWIDTH] IN BLOOD BY AUTOMATED COUNT: 12.4 % (ref 10–15)
GFR SERPL CREATININE-BSD FRML MDRD: 81 ML/MIN/1.73M2
GLUCOSE SERPL-MCNC: 138 MG/DL (ref 70–99)
GLUCOSE UR STRIP-MCNC: NEGATIVE MG/DL
HCT VFR BLD AUTO: 47.6 % (ref 35–47)
HGB BLD-MCNC: 15.4 G/DL (ref 11.7–15.7)
HGB UR QL STRIP: ABNORMAL
HYALINE CASTS: 8 /LPF
IMM GRANULOCYTES # BLD: 0 10E3/UL
IMM GRANULOCYTES NFR BLD: 1 %
INR PPP: 0.94 (ref 0.85–1.15)
KETONES UR STRIP-MCNC: 40 MG/DL
LEUKOCYTE ESTERASE UR QL STRIP: ABNORMAL
LIPASE SERPL-CCNC: 101 U/L (ref 13–60)
LYMPHOCYTES # BLD AUTO: 0.9 10E3/UL (ref 0.8–5.3)
LYMPHOCYTES NFR BLD AUTO: 10 %
MAGNESIUM SERPL-MCNC: 2 MG/DL (ref 1.7–2.3)
MCH RBC QN AUTO: 32.2 PG (ref 26.5–33)
MCHC RBC AUTO-ENTMCNC: 32.4 G/DL (ref 31.5–36.5)
MCV RBC AUTO: 100 FL (ref 78–100)
MONOCYTES # BLD AUTO: 0.6 10E3/UL (ref 0–1.3)
MONOCYTES NFR BLD AUTO: 7 %
MUCOUS THREADS #/AREA URNS LPF: PRESENT /LPF
NEUTROPHILS # BLD AUTO: 7.2 10E3/UL (ref 1.6–8.3)
NEUTROPHILS NFR BLD AUTO: 82 %
NITRATE UR QL: NEGATIVE
NRBC # BLD AUTO: 0 10E3/UL
NRBC BLD AUTO-RTO: 0 /100
NT-PROBNP SERPL-MCNC: 165 PG/ML (ref 0–900)
PH UR STRIP: 5 [PH] (ref 5–7)
PLATELET # BLD AUTO: 255 10E3/UL (ref 150–450)
POTASSIUM SERPL-SCNC: 3.9 MMOL/L (ref 3.4–5.3)
PROT SERPL-MCNC: 7.4 G/DL (ref 6.4–8.3)
RBC # BLD AUTO: 4.78 10E6/UL (ref 3.8–5.2)
RBC URINE: 1 /HPF
SODIUM SERPL-SCNC: 137 MMOL/L (ref 136–145)
SP GR UR STRIP: 1.01 (ref 1–1.03)
SQUAMOUS EPITHELIAL: 1 /HPF
TROPONIN T SERPL HS-MCNC: 9 NG/L
TSH SERPL DL<=0.005 MIU/L-ACNC: 1.17 UIU/ML (ref 0.3–4.2)
UROBILINOGEN UR STRIP-MCNC: NORMAL MG/DL
WBC # BLD AUTO: 8.8 10E3/UL (ref 4–11)
WBC URINE: 1 /HPF

## 2023-06-30 PROCEDURE — 36415 COLL VENOUS BLD VENIPUNCTURE: CPT | Performed by: FAMILY MEDICINE

## 2023-06-30 PROCEDURE — 93005 ELECTROCARDIOGRAM TRACING: CPT | Mod: 76 | Performed by: FAMILY MEDICINE

## 2023-06-30 PROCEDURE — 93010 ELECTROCARDIOGRAM REPORT: CPT | Performed by: FAMILY MEDICINE

## 2023-06-30 PROCEDURE — 93005 ELECTROCARDIOGRAM TRACING: CPT | Performed by: FAMILY MEDICINE

## 2023-06-30 PROCEDURE — 76705 ECHO EXAM OF ABDOMEN: CPT

## 2023-06-30 PROCEDURE — 85730 THROMBOPLASTIN TIME PARTIAL: CPT | Performed by: FAMILY MEDICINE

## 2023-06-30 PROCEDURE — 71045 X-RAY EXAM CHEST 1 VIEW: CPT | Mod: 26 | Performed by: STUDENT IN AN ORGANIZED HEALTH CARE EDUCATION/TRAINING PROGRAM

## 2023-06-30 PROCEDURE — 96360 HYDRATION IV INFUSION INIT: CPT

## 2023-06-30 PROCEDURE — 80053 COMPREHEN METABOLIC PANEL: CPT | Performed by: FAMILY MEDICINE

## 2023-06-30 PROCEDURE — 83690 ASSAY OF LIPASE: CPT | Performed by: FAMILY MEDICINE

## 2023-06-30 PROCEDURE — 99285 EMERGENCY DEPT VISIT HI MDM: CPT | Mod: 25 | Performed by: FAMILY MEDICINE

## 2023-06-30 PROCEDURE — 83735 ASSAY OF MAGNESIUM: CPT | Performed by: FAMILY MEDICINE

## 2023-06-30 PROCEDURE — 99213 OFFICE O/P EST LOW 20 MIN: CPT | Mod: FS

## 2023-06-30 PROCEDURE — 76705 ECHO EXAM OF ABDOMEN: CPT | Mod: 26 | Performed by: RADIOLOGY

## 2023-06-30 PROCEDURE — 99285 EMERGENCY DEPT VISIT HI MDM: CPT | Mod: 25

## 2023-06-30 PROCEDURE — 81001 URINALYSIS AUTO W/SCOPE: CPT | Performed by: FAMILY MEDICINE

## 2023-06-30 PROCEDURE — 84484 ASSAY OF TROPONIN QUANT: CPT | Performed by: FAMILY MEDICINE

## 2023-06-30 PROCEDURE — 83880 ASSAY OF NATRIURETIC PEPTIDE: CPT | Performed by: FAMILY MEDICINE

## 2023-06-30 PROCEDURE — 93010 ELECTROCARDIOGRAM REPORT: CPT | Mod: 76 | Performed by: FAMILY MEDICINE

## 2023-06-30 PROCEDURE — 84443 ASSAY THYROID STIM HORMONE: CPT | Performed by: FAMILY MEDICINE

## 2023-06-30 PROCEDURE — 85025 COMPLETE CBC W/AUTO DIFF WBC: CPT | Performed by: FAMILY MEDICINE

## 2023-06-30 PROCEDURE — 85610 PROTHROMBIN TIME: CPT | Performed by: FAMILY MEDICINE

## 2023-06-30 PROCEDURE — 258N000003 HC RX IP 258 OP 636: Performed by: FAMILY MEDICINE

## 2023-06-30 PROCEDURE — 96361 HYDRATE IV INFUSION ADD-ON: CPT

## 2023-06-30 PROCEDURE — 99215 OFFICE O/P EST HI 40 MIN: CPT | Performed by: PSYCHIATRY & NEUROLOGY

## 2023-06-30 PROCEDURE — 71045 X-RAY EXAM CHEST 1 VIEW: CPT

## 2023-06-30 RX ORDER — LIDOCAINE 40 MG/G
CREAM TOPICAL
Status: DISCONTINUED | OUTPATIENT
Start: 2023-06-30 | End: 2023-06-30 | Stop reason: HOSPADM

## 2023-06-30 RX ORDER — ADENOSINE 3 MG/ML
12 INJECTION, SOLUTION INTRAVENOUS ONCE
Status: COMPLETED | OUTPATIENT
Start: 2023-06-30 | End: 2023-06-30

## 2023-06-30 RX ADMIN — SODIUM CHLORIDE 1000 ML: 9 INJECTION, SOLUTION INTRAVENOUS at 09:31

## 2023-06-30 RX ADMIN — SODIUM CHLORIDE 1000 ML: 9 INJECTION, SOLUTION INTRAVENOUS at 10:30

## 2023-06-30 ASSESSMENT — ACTIVITIES OF DAILY LIVING (ADL)
ADLS_ACUITY_SCORE: 35

## 2023-06-30 NOTE — ED NOTES
Bed: ED16  Expected date:   Expected time:   Means of arrival:   [facility]   tachy

## 2023-06-30 NOTE — Clinical Note
Just an FYI. I am seeing this patient for Essential Tremor. I suggested medications. She had a previous reaction to Dilantin which is somewhat similar to primidone and I am awaiting a response from Nissa or Dahiana on this relationship. Other thing, she had an episode of arhythmia in clinic and I sent her to the ED where it looks like she got amiodarone to kick start her heart.   Ofelia

## 2023-06-30 NOTE — PATIENT INSTRUCTIONS
"We discussed several different factors that exacerbate tremor including uncontrolled emotional situations, poorly controlled depression, stress, anxiety, poor sleep, metabolic/electrolyte abnormalities, medication side effects, etc. We discussed several therapies for treatment of tremor including medications (propranolol, primidone, gabapentin), therapies (OT for assistive devices), wearable devices (Abram Trio), botulinum toxin injections (for head tremor), and surgeries (Deep Brain Stimulation, radiofrequency ablation/gamma knife and focused ultrasound). Would avoid primidone as BP usually runs low. Regarding surgeries, here at the [facility], we do gamma knife surgery. The [facility] does the focused ultra sound procedure which treat one side of the body. Not interested in Abram Trio, surgeries as this time. Neck tremors are not bothersome to patient.     Plan:   - We discussed primidone and gabapentin. Will discuss with pharmacists regarding your past reaction to dilantin and starting primidone. If primidone is not an option, will start gabapentin.   - If we start primidone 50 mg, I would recommend to follow this titration table:  Primidone 50 mg                 At bedtime    Week 1 0.5 tab   Week 2                  1 tab    - Stop at dose that improves tremor.    - Side effects include drowsiness, fatigue, balance issues, blurred vision. If you notice these symptoms and they cause issues, please call the clinic. We discussed \"First dose effect\" with this medication which can cause a feeling of dizziness and nausea that we expect to fade.   - If side effect develop, go ramonita k toprevious dose.   - Do not stop this medication abruptly.   - Places to find out more information about essential tremor and treatments are:  [email or web address].org  And  You can go to [email or web address] and look for patient information about tremor under the guidelines section.  - Call the clinic for questions or concerns.     Patient to return in 4-5 " months, for in-person visit, 30 minutes.

## 2023-06-30 NOTE — DISCHARGE INSTRUCTIONS
Home.  Your labs look good.  Your ultrasound of liver and pancreas looked normal.  You received IV fluids in the Er.  EP cardiology evaluated you after you converted back to normal rhythm  Referral placed for follow up to EP Cardiology as they should call you for appointment.,  Stay hydrated.  Home medications.  Return if any concerns at all.    Please make an appointment to follow up with Your Primary Care Provider and Cardiology Clinic (phone: [phone]) as soon as possible.    Results for orders placed or performed during the hospital encounter of 06/30/23   XR Chest Port 1 View     Status: None    Narrative    EXAM: Chest radiograph 6/30/2023 9:21 AM    HISTORY: 67 years Female palpitations.     COMPARISON: None.    TECHNIQUE: Portable AP view of the chest.    FINDINGS:   The trachea is midline. The cardiac silhouette size is within normal  limits. Distinct pulmonary vasculature. No focal pulmonary  consolidation. Streaky bibasilar predominant pulmonary opacities. Mild  elevation of the right hemidiaphragm. No appreciable pneumothorax or  pleural effusion. No acute or suspicious osseous abnormality.      Impression    IMPRESSION:  Bibasilar predominant streaky pulmonary opacities, favors  atelectasis/pulmonary edema.    I have personally reviewed the examination and initial interpretation  and I agree with the findings.    KRISTINE ESTRADA MD         SYSTEM ID:  [id number]   US Abdomen Limited     Status: None    Narrative    EXAMINATION: Limited Abdominal Ultrasound, 6/30/2023 11:33 AM     COMPARISON: None.    HISTORY: abdominal pain; diarrhea with slight elevated lipase eval  panc and biliary for any concerns.    FINDINGS:   Fluid: No evidence of ascites or pleural effusions.    Liver: The liver demonstrates normal echotexture, measuring 16.3 cm in  craniocaudal dimension. There is no focal mass.     Gallbladder: There is no wall thickening, pericholecystic fluid,  positive sonographic Sue's sign or  evidence for cholelithiasis.    Bile Ducts: Both the intra- and extrahepatic biliary system are of  normal caliber.  The common bile duct measures 4 mm in diameter.    Pancreas: Visualized portions of the head and body of the pancreas are  unremarkable.     Kidney: The right kidney measures 10.4 cm long. There is no  hydronephrosis or hydroureter, no shadowing renal calculi, cystic  lesion or mass.       Impression    IMPRESSION:   Normal right upper quadrant ultrasound.    I have personally reviewed the examination and initial interpretation  and I agree with the findings.    FANTA PATTERSON MD         SYSTEM ID:  [id number]   INR     Status: Normal   Result Value Ref Range    INR 0.94 0.85 - 1.15   Partial thromboplastin time     Status: Normal   Result Value Ref Range    aPTT 25 22 - 38 Seconds   Comprehensive metabolic panel     Status: Abnormal   Result Value Ref Range    Sodium 137 136 - 145 mmol/L    Potassium 3.9 3.4 - 5.3 mmol/L    Chloride 101 98 - 107 mmol/L    Carbon Dioxide (CO2) 21 (L) 22 - 29 mmol/L    Anion Gap 15 7 - 15 mmol/L    Urea Nitrogen 8.4 8.0 - 23.0 mg/dL    Creatinine 0.80 0.51 - 0.95 mg/dL    Calcium 9.3 8.8 - 10.2 mg/dL    Glucose 138 (H) 70 - 99 mg/dL    Alkaline Phosphatase 94 35 - 104 U/L    AST 24 0 - 45 U/L    ALT 23 0 - 50 U/L    Protein Total 7.4 6.4 - 8.3 g/dL    Albumin 4.3 3.5 - 5.2 g/dL    Bilirubin Total 0.4 <=1.2 mg/dL    GFR Estimate 81 >60 mL/min/1.73m2   Magnesium     Status: Normal   Result Value Ref Range    Magnesium 2.0 1.7 - 2.3 mg/dL   Troponin T, High Sensitivity     Status: Normal   Result Value Ref Range    Troponin T, High Sensitivity 9 <=14 ng/L   Lipase     Status: Abnormal   Result Value Ref Range    Lipase 101 (H) 13 - 60 U/L   TSH     Status: Normal   Result Value Ref Range    TSH 1.17 0.30 - 4.20 uIU/mL   Nt probnp inpatient (BNP)     Status: Normal   Result Value Ref Range    N terminal Pro BNP Inpatient 165 0 - 900 pg/mL   UA with Microscopic reflex to  Culture     Status: Abnormal    Specimen: Urine, Clean Catch   Result Value Ref Range    Color Urine Light Yellow Colorless, Straw, Light Yellow, Yellow    Appearance Urine Clear Clear    Glucose Urine Negative Negative mg/dL    Bilirubin Urine Negative Negative    Ketones Urine 40 (A) Negative mg/dL    Specific Gravity Urine 1.009 1.003 - 1.035    Blood Urine Small (A) Negative    pH Urine 5.0 5.0 - 7.0    Protein Albumin Urine 10 (A) Negative mg/dL    Urobilinogen Urine Normal Normal, 2.0 mg/dL    Nitrite Urine Negative Negative    Leukocyte Esterase Urine Trace (A) Negative    Bacteria Urine Few (A) None Seen /HPF    Mucus Urine Present (A) None Seen /LPF    RBC Urine 1 <=2 /HPF    WBC Urine 1 <=5 /HPF    Squamous Epithelials Urine 1 <=1 /HPF    Hyaline Casts Urine 8 (H) <=2 /LPF    Narrative    Urine Culture not indicated   CBC with platelets and differential     Status: Abnormal   Result Value Ref Range    WBC Count 8.8 4.0 - 11.0 10e3/uL    RBC Count 4.78 3.80 - 5.20 10e6/uL    Hemoglobin 15.4 11.7 - 15.7 g/dL    Hematocrit 47.6 (H) 35.0 - 47.0 %     78 - 100 fL    MCH 32.2 26.5 - 33.0 pg    MCHC 32.4 31.5 - 36.5 g/dL    RDW 12.4 10.0 - 15.0 %    Platelet Count 255 150 - 450 10e3/uL    % Neutrophils 82 %    % Lymphocytes 10 %    % Monocytes 7 %    % Eosinophils 0 %    % Basophils 0 %    % Immature Granulocytes 1 %    NRBCs per 100 WBC 0 <1 /100    Absolute Neutrophils 7.2 1.6 - 8.3 10e3/uL    Absolute Lymphocytes 0.9 0.8 - 5.3 10e3/uL    Absolute Monocytes 0.6 0.0 - 1.3 10e3/uL    Absolute Eosinophils 0.0 0.0 - 0.7 10e3/uL    Absolute Basophils 0.0 0.0 - 0.2 10e3/uL    Absolute Immature Granulocytes 0.0 <=0.4 10e3/uL    Absolute NRBCs 0.0 10e3/uL   EKG 12 lead     Status: None (Preliminary result)   Result Value Ref Range    Systolic Blood Pressure  mmHg    Diastolic Blood Pressure  mmHg    Ventricular Rate 64 BPM    Atrial Rate 64 BPM    AZ Interval 172 ms    QRS Duration 88 ms     ms    QTc 443  ms    P Axis 47 degrees    R AXIS -1 degrees    T Axis 49 degrees    Interpretation ECG       Sinus rhythm  Cannot rule out Inferior infarct , age undetermined  Abnormal ECG     CBC with platelets differential     Status: Abnormal    Narrative    The following orders were created for panel order CBC with platelets differential.  Procedure                               Abnormality         Status                     ---------                               -----------         ------                     CBC with platelets and d...[[id number]]  Abnormal            Final result                 Please view results for these tests on the individual orders.

## 2023-06-30 NOTE — CONSULTS
Electrophysiology Consultation Note   EP Attending: Dr. Harmon   Reason for consultation: palpitations, tachyarrhythmia.   Provider requesting consultation: Dr. George  Date of Service: 6/30/2023      HPI:   Damon Rock is a 67 year old female with past medical history significant for atrial flutter and essential tremors. She presented to the ER from clinic this morning for further evaluation of heart racing with SVT on EKG done at clinic. Pt reports she woke up this morning around 6 am feeling her heart racing, somewhat similar to her atrial flutter episodes previously, but she felt this episode was a bit different. She took metoprolol without improvement. Presented to neurology clinic for apt where EKG was done demonstrating tachyarrhythmia. Upon arrival at ER she continued to have heart racing. EP consulted for evaluation of rhythm and further management. Labs in ER with stable electrolytes, cr 0.8. Current cardiac meds: Toprol XL 25 mg daily.   As far as cardiology/EP history she follows with Dr Vasquez. She has had one episode of atrial flutter that terminated promptly after dose of metoprolol. He has discussed that if episodes increase in frequency anticoagulation may need to be initiated.     Past Medical History:   No past medical history on file.  Past Surgical History:   No past surgical history on file.  Allergies: Per MAR     Allergies   Allergen Reactions     Shellfish Derived [Shellfish-Derived Products] Anaphylaxis     Phenytoin Sodium Extended [Phenytoin] Other (See Comments)     Medications:   Per MAR current outpatient cardiovascular medications include: (Not in a hospital admission)    Current Outpatient Medications   Medication Sig Dispense Refill     ALPRAZolam (XANAX) 1 MG tablet [ALPRAZOLAM (XANAX) 1 MG TABLET] As needed       atorvastatin (LIPITOR) 20 MG tablet [ATORVASTATIN (LIPITOR) 20 MG TABLET] Take 20 mg by mouth daily.        busPIRone (BUSPAR) 10 MG tablet [BUSPIRONE (BUSPAR)  "10 MG TABLET] TAKE 1 TABLET BY MOUTH TWICE DAILY FOR ANXIETY       cholecalciferol (VITAMIN D3) 125 mcg (5000 units) capsule Take 125 mcg by mouth daily       FLUoxetine (PROZAC) 20 MG capsule Take 20 mg by mouth daily       metoprolol succinate ER (TOPROL XL) 25 MG 24 hr tablet TAKE 1 TABLET BY MOUTH EVERY DAY FOR TREMORS       metoprolol tartrate (LOPRESSOR) 50 MG tablet Take 1 tablet by mouth as needed       Current Facility-Administered Medications   Medication Dose Route Frequency     sodium chloride 0.9%  1,000 mL Intravenous Once     adenosine  12 mg Intravenous Once     sodium chloride (PF)  3 mL Intracatheter Q8H     Family History:   Family History   Problem Relation Age of Onset     Heart Failure Mother 70.00     Social History:   Social History     Tobacco Use     Smoking status: Former     Types: Cigarettes     Quit date: 1/1/2008     Years since quitting: 15.5     Smokeless tobacco: Never   Substance Use Topics     Alcohol use: Yes     Comment: 2 monthly       ROS:   A comprehensive 10 point ROS was negative other than as mentioned in HPI.    Physical Examination:   VITALS: /84   Pulse 57   Temp 98  F (36.7  C) (Oral)   Resp 11   Ht 1.6 m (5' 3\")   Wt 71.2 kg (157 lb)   SpO2 99%   BMI 27.81 kg/m      GENERAL APPEARANCE: AxO, NAD   HEENT: NCAT, MMM.   CHEST: CTAB   CARDIOVASCULAR: S1S2, Reg, No m/r/g.   ABDOMEN: BS+, soft, No pulsatile masses or bruits.   EXTREMITIES: No pedal edema. Distal pulses intact.   NEURO: Grossly nonfocal.   PSYCH: Normal affect.  SKIN: Warm and dry.   Data:   Labs:  BMP  Recent Labs   Lab 06/30/23  0913      POTASSIUM 3.9   CHLORIDE 101   SASHA 9.3   CO2 21*   BUN 8.4   CR 0.80   *     CBC  Recent Labs   Lab 06/30/23 0913   WBC 8.8   RBC 4.78   HGB 15.4   HCT 47.6*      MCH 32.2   MCHC 32.4   RDW 12.4        INR  Recent Labs   Lab 06/30/23 0913   INR 0.94     No results found for: CKTOTAL, CKMB, TROPN  Cholesterol (mg/dL)   Date Value "   2022 179   2021 184   2019 194   2018 179     Direct Measure HDL (mg/dL)   Date Value   2022 78   2021 73   2019 83   2018 74     LDL Cholesterol Calculated (mg/dL)   Date Value   2022 80   2021 87   2019 92   2018 91     EK23      ECHO: 22  Interpretation Summary  1.Left ventricular size, wall motion and function are normal. The ejection  fraction is 60-65%.  2.Normal right ventricle size and systolic function.  3.The aortic valve is trileaflet. There is mild (1+) aortic regurgitation.  Decel 1/2 time is 528 msec.  4.Normal size ascending aorta.  There is no comparison study available.    Assessment:   Damon Rock is a 67 year old female with past medical history significant for atrial flutter and essential tremors. She presented to the ER from clinic this morning for further evaluation of heart racing with SVT on EKG done at clinic. Pt reports she woke up this morning around 6 am feeling her heart racing, somewhat similar to her atrial flutter episodes previously, but she felt this episode was a bit different. She took metoprolol without improvement. Presented to neurology clinic for apt where EKG was done demonstrating tachyarrhythmia. Upon arrival at ER she continued to have heart racing. EP consulted for evaluation of rhythm and further management. Labs in ER with stable electrolytes, cr 0.8. Current cardiac meds: Toprol XL 25 mg daily.   As far as cardiology/EP history she follows with Dr Vasquez. She has had one episode of atrial flutter that terminated promptly after dose of metoprolol. He has discussed that if episodes increase in frequency anticoagulation may need to be initiated.     EP Recommendations:  Reviewed EKG completed in the ER. EKG consistent with likely SVT (AVNRT -?) vs accelerated junctional rhythm. Planned to administer 12 mg adenosine for further rhythm evaluation. When entering the room to  administer adenosine patient sponteanously converted to sinus rhythm, making SVT more likely mechanism. She has now remained in sinus with resolution of symptoms.    - Continue prior to admission metoprolol succinate 25 mg daily.    - Follow up with primary cardiologist or EP as outpatient to discuss further medical management vs ablation.     The patient states understanding and is agreeable with plan.   Thank you for allowing us to participate in the care of this patient.     The patient was discussed w/ Dr. Harmon.  The above note reflects our joint plan.    Hayde Dewitt PA-C  [facility]  Electrophysiology Consult Service  Pager: [phone]    I very much appreciated the opportunity to see and assess Damon Rock in the hospital initiated visit with CV DOM  Hayde Dewitt.  Cardiology was consulted by the emergency department for evaluation of a narrow QRS tachycardia with palpitations probably associated with retrograde P waves.  Differential diagnosis is extensive and includes reentrant supraventricular tachycardia with a relatively long retrograde limb, junctional rhythm with retrograde conduction, or a sinus rhythm with a very long UT interval.  Adenosine infusion was planned but the rhythm terminated abruptly prior to administering the drug.  Recommendations for subsequent prophylactic treatment were provided.  I agree with the note above which summarizes my findings and current recommendations.  Please do not hesitate to contact my office if you have any questions or concerns.      Armando Harmon MD  Cardiac Arrhythmia Service  [facility]  [phone]

## 2023-06-30 NOTE — ED TRIAGE NOTES
BIBA from [facility] for palpatations  /flutter/tachy. Pt had a folow up at the neuro clinic for centralized tremors. Hx of tachy and aflutter, tremors and anxiety. Denies chest pain, SOB, or syncope.   Triage Assessment     Row Name 06/30/23 0851       Triage Assessment (Adult)    Airway WDL WDL       Respiratory WDL    Respiratory WDL WDL       Skin Circulation/Temperature WDL    Skin Circulation/Temperature WDL WDL       Cardiac WDL    Cardiac WDL X   palpatations. tachy.       Peripheral/Neurovascular WDL    Peripheral Neurovascular WDL WDL       Cognitive/Neuro/Behavioral WDL    Cognitive/Neuro/Behavioral WDL WDL

## 2023-06-30 NOTE — Clinical Note
Ladies, this patient had swelling in the distant past on dilantin. I want to prescribe primidone but am getting a warning. Am I okay to treat with this  medication?   Thank you,   Ofelia

## 2023-06-30 NOTE — PROGRESS NOTES
Department of Neurology  Movement Disorders Division     Patient: Damon Rock   MRN: [id number]   : 1955   Date of Visit:  2023    History of Present Illness  Ms. Rock is a pleasant 67 year old female that presents to Neurology Movement clinic for follow up regarding Essential Tremor management.  Patient was last seen on 2023 as a new patient where we discussed the diagnosis of Essential Tremor and its management. She was not interested in starting medication we discussed the Abram Trio.  She wanted time to decide on whether or not she wanted to pursue the Abram Trio.  We discussed that she does not need to continue metoprolol if she feels that her tremors are not disabling.  She did note that her tremors did affect her eating and other recreational activities.  She was referred to occupational therapy for assistive devices to help with tremor.    History obtained from patient. Patient is accompanied by partner, Kaleb.  Main complaint: tremor  Patient reports are worse and involve the head, hands, and some in toes. She is unable to place a hussein on a hook. She cannot write. She has to have food cut for her. She is not as concerned of her head tremor. Denies neck pain from tremors. Didn't do Occupational Therapy. Kaleb reports that on the extra metoprolol tartrate (used to treat tremor) Damon did not feel good and they recall SBP being lower than 90. Usually her SBP is 90s. Didn't notice change in tremor from stopping metoprolol tartrate, but only took for 3 days. She is no longer taking extra metoprolol to treat tremor.     During the appointment, she reported an atrial flutter event and reported taking a medication. She was laying down throughout the exam.    Review of Systems:  Other than that mentioned above, the remainder of 12 systems reviewed were negative.    PMH: unchanged  PSH: unchanged  FH: unchanged  SH: unchanged    Medications:  Current Outpatient Medications    Medication Sig Dispense Refill    ALPRAZolam (XANAX) 1 MG tablet [ALPRAZOLAM (XANAX) 1 MG TABLET] As needed      atorvastatin (LIPITOR) 20 MG tablet [ATORVASTATIN (LIPITOR) 20 MG TABLET] Take 20 mg by mouth daily.       busPIRone (BUSPAR) 10 MG tablet [BUSPIRONE (BUSPAR) 10 MG TABLET] TAKE 1 TABLET BY MOUTH TWICE DAILY FOR ANXIETY      cholecalciferol (VITAMIN D3) 125 mcg (5000 units) capsule Take 125 mcg by mouth daily      FLUoxetine (PROZAC) 20 MG capsule Take 20 mg by mouth daily      metoprolol tartrate (LOPRESSOR) 50 MG tablet Take 1 tablet by mouth as needed      metoprolol succinate ER (TOPROL XL) 25 MG 24 hr tablet TAKE 1 TABLET BY MOUTH EVERY DAY FOR TREMORS             Allergies   Allergen Reactions    Shellfish Derived [Shellfish-Derived Products] Anaphylaxis    Phenytoin Sodium Extended [Phenytoin] Other (See Comments)          Physical Exam:  The patient's  weight is 71.2 kg (157 lb). Her blood pressure is 147/94 (abnormal) and her pulse is 82. Her oxygen saturation is 94%.    Physical Exam:  GENERAL: alert, active, attentive, appropriately groomed  HEENT: normocephalic, eyes open with no discharge, nares patent, oropharynx clear-no lesions  CHEST: minimally/moderately labored breathing  PSYCH: mood distresed    Neurologic Exam:  MENTAL STATUS: Alert and oriented to person, place, time, and situation. Follows commands. Recent and remote memory intact. Attention span and concentration intact. Fund of knowledge intact to current events.   SPEECH: Fluent, intact comprehension and articulation  CN: visual fields, facial movement symmetric, hearing grossly intact to conversation  MOTOR: Moves all extremities equally against gravity without difficulty  Involuntary movements:   Head tremor 1 cm, postural hand tremor 2-3 cm  Some distress while laying down and worsening symptoms when sitting up    Data Reviewed: I have personally reviewed the tests/studies below.   - hemoglobin A1c, CMP within normal limits  "    Impression:  Damon Rock is a 67 year old female with Essential Tremor. The patient's main concern today is tremor, hands > head. We discussed several different factors that exacerbate tremor including uncontrolled emotional situations, poorly controlled depression, stress, anxiety, poor sleep, metabolic/electrolyte abnormalities, medication side effects, etc. We discussed several therapies for treatment of tremor including medications (propranolol, primidone, gabapentin), therapies (OT for assistive devices), wearable devices (Abram Trio), botulinum toxin injections (for head tremor), and surgeries (Deep Brain Stimulation, radiofrequency ablation/gamma knife and focused ultrasound). Would avoid primidone as BP usually runs low. Regarding surgeries, here at the [facility], we do gamma knife surgery. The [facility] does the focused ultra sound procedure which treat one side of the body. Not interested in Abram Trio, surgeries as this time. Neck tremors are not bothersome to patient and she is not interested in neurotoxin injections.     Patient reported feeling unwell during her visit and reported a fast heart rate. She took her prescribed metoprolol intended for these situations. ECG revealed possible SVT. She was sent to the ER for further management.     Plan:   - We discussed primidone and gabapentin. Will discuss with pharmacists regarding your past reaction to dilantin (swelling) and starting primidone. If primidone is not an option, will start gabapentin.   - If we start primidone 50 mg, I would recommend to follow this titration table:  Primidone 50 mg                 At bedtime    Week 1 0.5 tab   Week 2                  1 tab    - Stop at dose that improves tremor.    - Side effects include drowsiness, fatigue, balance issues, blurred vision. If you notice these symptoms and they cause issues, please call the clinic. We discussed \"First dose effect\" with this medication which can cause a feeling of " dizziness and nausea that we expect to fade.   - If side effect develop, go ramonita k toprevious dose.   - Do not stop this medication abruptly.   - Places to find out more information about essential tremor and treatments are:  [email or web address].org  And  You can go to [employer] and look for patient information about tremor under the guidelines section.  - Call the clinic for questions or concerns.     Patient to return in 4-5 months, for in-person visit, 30 minutes.     Ofelia Batista DO, MA   of Neurology   [facility]    50 minutes spent on date of encounter doing chart reviews and exam and documentation and further activities as noted above.     Addendum:  Discussed with pharmacy, there should be no cross reactivity with Dilantin and primidone. Will start low dose (detailed above) and observe.     Addendum:   Patient reported swelling and peeling of hands and feet after taking Dilantin. This is likely an allergic reaction to Dilantin. Since Dilantin and primidone has a cross reactivity will avoid primidone. This was discussed with Dr. Dahiana Claros, PharmD. Will start gabapentin instead. Rx sent to patient's pharmacy.   Plan:  Gabapentin 100 mg  AM PM Bed   Week 1   100mg   Week 2 100mg  100mg   Week 3 100mg 100mg 100mg     Common side effects include drowsiness and dizziness. Do not drive until you know how this medication will affect you.

## 2023-06-30 NOTE — LETTER
2023       RE: Damon Rock  [address] [address]       Dear Colleague,    Thank you for referring your patient, Damon Rock, to the [facility] [place] [facility]. Please see a copy of my visit note below.    Department of Neurology  Movement Disorders Division     Patient: Damon Rock   MRN: [id number]   : 1955   Date of Visit:  2023    History of Present Illness  Ms. Rock is a pleasant 67 year old female that presents to Neurology Movement clinic for follow up regarding Essential Tremor management.  Patient was last seen on 2023 as a new patient where we discussed the diagnosis of Essential Tremor and its management. She was not interested in starting medication we discussed the Abram Trio.  She wanted time to decide on whether or not she wanted to pursue the Abram Trio.  We discussed that she does not need to continue metoprolol if she feels that her tremors are not disabling.  She did note that her tremors did affect her eating and other recreational activities.  She was referred to occupational therapy for assistive devices to help with tremor.    History obtained from patient. Patient is accompanied by partner, Kaleb.  Main complaint: tremor  Patient reports are worse and involve the head, hands, and some in toes. She is unable to place a hussein on a hook. She cannot write. She has to have food cut for her. She is not as concerned of her head tremor. Denies neck pain from tremors. Didn't do Occupational Therapy. Kaleb reports that on the extra metoprolol tartrate (used to treat tremor) Damon did not feel good and they recall SBP being lower than 90. Usually her SBP is 90s. Didn't notice change in tremor from stopping metoprolol tartrate, but only took for 3 days. She is no longer taking extra metoprolol to treat tremor.     During the appointment, she reported an  atrial flutter event and reported taking a medication. She was laying down throughout the exam.    Review of Systems:  Other than that mentioned above, the remainder of 12 systems reviewed were negative.    PMH: unchanged  PSH: unchanged  FH: unchanged  SH: unchanged    Medications:  Current Outpatient Medications   Medication Sig Dispense Refill    ALPRAZolam (XANAX) 1 MG tablet [ALPRAZOLAM (XANAX) 1 MG TABLET] As needed      atorvastatin (LIPITOR) 20 MG tablet [ATORVASTATIN (LIPITOR) 20 MG TABLET] Take 20 mg by mouth daily.       busPIRone (BUSPAR) 10 MG tablet [BUSPIRONE (BUSPAR) 10 MG TABLET] TAKE 1 TABLET BY MOUTH TWICE DAILY FOR ANXIETY      cholecalciferol (VITAMIN D3) 125 mcg (5000 units) capsule Take 125 mcg by mouth daily      FLUoxetine (PROZAC) 20 MG capsule Take 20 mg by mouth daily      metoprolol tartrate (LOPRESSOR) 50 MG tablet Take 1 tablet by mouth as needed      metoprolol succinate ER (TOPROL XL) 25 MG 24 hr tablet TAKE 1 TABLET BY MOUTH EVERY DAY FOR TREMORS             Allergies   Allergen Reactions    Shellfish Derived [Shellfish-Derived Products] Anaphylaxis    Phenytoin Sodium Extended [Phenytoin] Other (See Comments)          Physical Exam:  The patient's  weight is 71.2 kg (157 lb). Her blood pressure is 147/94 (abnormal) and her pulse is 82. Her oxygen saturation is 94%.    Physical Exam:  GENERAL: alert, active, attentive, appropriately groomed  HEENT: normocephalic, eyes open with no discharge, nares patent, oropharynx clear-no lesions  CHEST: minimally/moderately labored breathing  PSYCH: mood distresed    Neurologic Exam:  MENTAL STATUS: Alert and oriented to person, place, time, and situation. Follows commands. Recent and remote memory intact. Attention span and concentration intact. Fund of knowledge intact to current events.   SPEECH: Fluent, intact comprehension and articulation  CN: visual fields, facial movement symmetric, hearing grossly intact to conversation  MOTOR: Moves  all extremities equally against gravity without difficulty  Involuntary movements:   Head tremor 1 cm, postural hand tremor 2-3 cm  Some distress while laying down and worsening symptoms when sitting up    Data Reviewed: I have personally reviewed the tests/studies below.   - hemoglobin A1c, CMP within normal limits     Impression:  Damon Rock is a 67 year old female with Essential Tremor. The patient's main concern today is tremor, hands > head. We discussed several different factors that exacerbate tremor including uncontrolled emotional situations, poorly controlled depression, stress, anxiety, poor sleep, metabolic/electrolyte abnormalities, medication side effects, etc. We discussed several therapies for treatment of tremor including medications (propranolol, primidone, gabapentin), therapies (OT for assistive devices), wearable devices (Abram Trio), botulinum toxin injections (for head tremor), and surgeries (Deep Brain Stimulation, radiofrequency ablation/gamma knife and focused ultrasound). Would avoid primidone as BP usually runs low. Regarding surgeries, here at the [facility], we do gamma knife surgery. The [facility] does the focused ultra sound procedure which treat one side of the body. Not interested in Abram Trio, surgeries as this time. Neck tremors are not bothersome to patient and she is not interested in neurotoxin injections.     Patient reported feeling unwell during her visit and reported a fast heart rate. She took her prescribed metoprolol intended for these situations. ECG revealed possible SVT. She was sent to the ER for further management.     Plan:   - We discussed primidone and gabapentin. Will discuss with pharmacists regarding your past reaction to dilantin (swelling) and starting primidone. If primidone is not an option, will start gabapentin.   - If we start primidone 50 mg, I would recommend to follow this titration table:  Primidone 50 mg                 At bedtime    Week 1  "0.5 tab   Week 2                  1 tab    - Stop at dose that improves tremor.    - Side effects include drowsiness, fatigue, balance issues, blurred vision. If you notice these symptoms and they cause issues, please call the clinic. We discussed \"First dose effect\" with this medication which can cause a feeling of dizziness and nausea that we expect to fade.   - If side effect develop, go ramonita k toprevious dose.   - Do not stop this medication abruptly.   - Places to find out more information about essential tremor and treatments are:  Essentialtremor.org  And  You can go to [employer] and look for patient information about tremor under the guidelines section.  - Call the clinic for questions or concerns.     Patient to return in 4-5 months, for in-person visit, 30 minutes.         50 minutes spent on date of encounter doing chart reviews and exam and documentation and further activities as noted above.                  Again, thank you for allowing me to participate in the care of your patient.      Sincerely,    Ofelia Batista, DO      "

## 2023-06-30 NOTE — ED PROVIDER NOTES
[place] EMERGENCY DEPARTMENT ([facility])    6/30/23       ED PROVIDER NOTE   ED 16 8:52 AM   History     Chief Complaint   Patient presents with     Palpitations     The history is provided by medical records, the patient and a significant other (partner).     Damon Rock is a 67 year old female with history of atrial flutter, essential tremor who presents from clinic as a rapid response for further evaluation of heart racing and prehospital EKG concerning for SVT.  Patient's partner Kaleb provides bulk of history.  Patient was at her usual state of health last night but did not sleep well due to nightmares.  At 6:15 AM today she woke up with her sensation of heart racing, similar to her prior episodes of atrial flutter.  She took a dose of metoprolol at 7 AM without improvement. She was at neurology clinic today and reported to the nurse that she felt her heart racing, had an EKG that they felt was representative of SVT.  A rapid response was called and she was brought here for further evaluation.  She continues to have heart racing at this time.  Kaleb states this atrial flutter was discovered few years ago, she Hasn't had much problems with it, gets maybe 4 episodes a year, but lately has been having an increased frequency of episodes.  She had 3 episodes in the past month. Not on anticoagulation.  Partner notes that she has been having increased frequency of diarrhea over the past few days, has been taking Imodium, does not know if this could be related.  She denies dehydration.  She has never presented here for this, was evaluated at the [facility] heart specialist in the past. No chest pain, lightheadedness, or dizziness. No history of ablation, cardioversion. No history of MI or thyroid problems, lung disease, or asthma.  No alcohol use.     Past Medical History  No past medical history on file.  No past surgical history on file.  ALPRAZolam (XANAX) 1 MG  "tablet  atorvastatin (LIPITOR) 20 MG tablet  busPIRone (BUSPAR) 10 MG tablet  cholecalciferol (VITAMIN D3) 125 mcg (5000 units) capsule  FLUoxetine (PROZAC) 20 MG capsule  metoprolol succinate ER (TOPROL XL) 25 MG 24 hr tablet      Allergies   Allergen Reactions     Shellfish Derived [Shellfish-Derived Products] Anaphylaxis     Phenytoin Sodium Extended [Phenytoin] Other (See Comments)     Family History  Family History   Problem Relation Age of Onset     Heart Failure Mother 70.00     Social History   Social History     Tobacco Use     Smoking status: Former     Types: Cigarettes     Quit date: 1/1/2008     Years since quitting: 15.5     Smokeless tobacco: Never   Substance Use Topics     Alcohol use: Yes     Comment: 2 monthly     Drug use: Never         A medically appropriate review of systems was performed with pertinent positives and negatives noted in the HPI, and all other systems negative.    Physical Exam   BP: (!) 116/96  Pulse: (!) 133  Temp: 97.9  F (36.6  C)  Resp: 16  Height: 160 cm (5' 3\")  Weight: 71.2 kg (157 lb)  SpO2: 97 %  Physical Exam  Vitals and nursing note reviewed.   Constitutional:       General: She is in acute distress.      Appearance: Normal appearance. She is well-developed. She is not toxic-appearing.      Comments: Patient here with significant other/partner who gives a lot of the history patient initially less history given.  Patient in a tachycardia noted blood pressure stable otherwise neurologically intact patient with chronic tremor   HENT:      Head: Normocephalic and atraumatic.      Nose: Nose normal.      Mouth/Throat:      Mouth: Mucous membranes are moist.      Pharynx: Oropharynx is clear.   Eyes:      General: No scleral icterus.     Extraocular Movements: Extraocular movements intact.      Conjunctiva/sclera: Conjunctivae normal.      Pupils: Pupils are equal, round, and reactive to light.   Cardiovascular:      Rate and Rhythm: Regular rhythm. Tachycardia present.    "   Comments: Narrow complex tachycardia 130  Pulmonary:      Effort: Pulmonary effort is normal. No respiratory distress.      Breath sounds: No stridor.   Chest:      Chest wall: No tenderness.   Abdominal:      General: Abdomen is flat. There is no distension.      Palpations: Abdomen is soft.      Tenderness: There is no abdominal tenderness. There is no guarding.   Musculoskeletal:         General: No swelling or tenderness.      Cervical back: Normal range of motion and neck supple. No rigidity.   Skin:     General: Skin is warm and dry.      Capillary Refill: Capillary refill takes less than 2 seconds.      Coloration: Skin is not jaundiced or pale.      Findings: No rash.   Neurological:      General: No focal deficit present.      Mental Status: She is alert and oriented to person, place, and time. Mental status is at baseline.      Comments: Patient with chronic tremor otherwise stable no focal findings   Psychiatric:      Comments: Patient mildly anxious here otherwise appropriate           ED Course, Procedures, & Data       Records reviewed in epic.  Patient with office visit by neurology noted with EKG reviewed etc.  Patient transferred to ER.  Patient previous office visits with neurology also for essential tremor in January this year also.    Video patient had EKG done revealing what appears to be a AV darci reentry tachycardia rate approximately 25.  IV established liter normal saline given in the ER x2 L.    Consultation with EP cardiology who did come down to see the patient prior to her coming down patient converted back to a sinus rhythm repeat EKG noted sinus rhythm patient maintained that in the ER.    In the ER EKG is interpreted below x2  Urinalysis no with no sign of infection.  Troponin was 9 magnesium 2 INR 0.94.  Sodium 137 potassium 3.9.  Bicarb 21 gap is 15 creatinine 0.8 calcium 9.3 glucose 138 liver function test normal limits patient's lipase was 101.  White count 8.8 hemoglobin is  15.4.  Platelets are 12.4.    Chest x-ray interpreted by myself also reveals some bibasilar streaking but no pneumothorax effusion etc.    As noted patient seen by EP cardiology recommendations for outpatient follow-up in the meantime patient feels much better we did do right upper quadrant ultrasound per radiology was normal.  This was done as lipase was elevated patient's diarrhea but no acute findings noted patient otherwise been hydrated here with 2 L feeling fine maintaining sinus rhythm wants to go home.  At this point will be discharged I referral placed for EP cardiology follow-up in the next couple weeks return if worsening symptoms any other concerns patient agrees with plan comfortable discharge.            Procedures       ED Course Selections:          EKG Interpretation:      EKG Number: 1 at 7:49 AM from clinic/rapid response  Interpreted by Charles Heredia MD  Symptoms at time of EKG: Heart racing  Rhythm: Narrow complex tachycardia (machine read describes this as supraventricular tachycardia, I disagree)  Rate: 142 bpm  Axis: NORMAL  Ectopy: none  Conduction: normal  ST Segments/ T Waves: No ST-T wave changes  Q Waves: none  Comparison to prior: Tachycardia new compared to prior EKG from 3/10/2021    Clinical Impression: Narrow complex tachycardia           EKG Interpretation:      Interpreted by Charles Soler MD  Time reviewed: 8:40 am  Symptoms at time of EKG: Atrial flutter  Rhythm: Narrow complex tachycardia  Rate: 129 bpm  Axis: normal  Ectopy: None  Conduction: Normal  ST Segments/ T Waves: no ST-T wave changes  Q Waves: none  Comparison to prior: Similar to prior EKG done in clinic from rapid response at 7:49 AM today  Clinical Impression: Narrow complex tachycardia, otherwise no acute ischemic changes         EKG Interpretation:      Interpreted by Charles Soler MD  Time reviewed: 9:40 am  Symptoms at time of EKG: Heart palpitations   Rhythm: normal sinus   Rate: 64 bpm  Axis: Normal  Ectopy:  none  Conduction: normal  ST Segments/ T Waves: No ST-T wave changes  Q Waves: none  Comparison to prior: Changed from prior at 8:40 am; tachycardia has resolved    Clinical Impression: normal EKG          Results for orders placed or performed during the hospital encounter of 06/30/23   XR Chest Port 1 View     Status: None    Narrative    EXAM: Chest radiograph 6/30/2023 9:21 AM    HISTORY: 67 years Female palpitations.     COMPARISON: None.    TECHNIQUE: Portable AP view of the chest.    FINDINGS:   The trachea is midline. The cardiac silhouette size is within normal  limits. Distinct pulmonary vasculature. No focal pulmonary  consolidation. Streaky bibasilar predominant pulmonary opacities. Mild  elevation of the right hemidiaphragm. No appreciable pneumothorax or  pleural effusion. No acute or suspicious osseous abnormality.      Impression    IMPRESSION:  Bibasilar predominant streaky pulmonary opacities, favors  atelectasis/pulmonary edema.    I have personally reviewed the examination and initial interpretation  and I agree with the findings.    KRISTINE ESTRADA MD         SYSTEM ID:  [id number]   US Abdomen Limited     Status: None    Narrative    EXAMINATION: Limited Abdominal Ultrasound, 6/30/2023 11:33 AM     COMPARISON: None.    HISTORY: abdominal pain; diarrhea with slight elevated lipase eval  panc and biliary for any concerns.    FINDINGS:   Fluid: No evidence of ascites or pleural effusions.    Liver: The liver demonstrates normal echotexture, measuring 16.3 cm in  craniocaudal dimension. There is no focal mass.     Gallbladder: There is no wall thickening, pericholecystic fluid,  positive sonographic Sue's sign or evidence for cholelithiasis.    Bile Ducts: Both the intra- and extrahepatic biliary system are of  normal caliber.  The common bile duct measures 4 mm in diameter.    Pancreas: Visualized portions of the head and body of the pancreas are  unremarkable.     Kidney: The right kidney  measures 10.4 cm long. There is no  hydronephrosis or hydroureter, no shadowing renal calculi, cystic  lesion or mass.       Impression    IMPRESSION:   Normal right upper quadrant ultrasound.    I have personally reviewed the examination and initial interpretation  and I agree with the findings.    FANTA PATTERSON MD         SYSTEM ID:  [id number]   INR     Status: Normal   Result Value Ref Range    INR 0.94 0.85 - 1.15   Partial thromboplastin time     Status: Normal   Result Value Ref Range    aPTT 25 22 - 38 Seconds   Comprehensive metabolic panel     Status: Abnormal   Result Value Ref Range    Sodium 137 136 - 145 mmol/L    Potassium 3.9 3.4 - 5.3 mmol/L    Chloride 101 98 - 107 mmol/L    Carbon Dioxide (CO2) 21 (L) 22 - 29 mmol/L    Anion Gap 15 7 - 15 mmol/L    Urea Nitrogen 8.4 8.0 - 23.0 mg/dL    Creatinine 0.80 0.51 - 0.95 mg/dL    Calcium 9.3 8.8 - 10.2 mg/dL    Glucose 138 (H) 70 - 99 mg/dL    Alkaline Phosphatase 94 35 - 104 U/L    AST 24 0 - 45 U/L    ALT 23 0 - 50 U/L    Protein Total 7.4 6.4 - 8.3 g/dL    Albumin 4.3 3.5 - 5.2 g/dL    Bilirubin Total 0.4 <=1.2 mg/dL    GFR Estimate 81 >60 mL/min/1.73m2   Magnesium     Status: Normal   Result Value Ref Range    Magnesium 2.0 1.7 - 2.3 mg/dL   Troponin T, High Sensitivity     Status: Normal   Result Value Ref Range    Troponin T, High Sensitivity 9 <=14 ng/L   Lipase     Status: Abnormal   Result Value Ref Range    Lipase 101 (H) 13 - 60 U/L   TSH     Status: Normal   Result Value Ref Range    TSH 1.17 0.30 - 4.20 uIU/mL   Nt probnp inpatient (BNP)     Status: Normal   Result Value Ref Range    N terminal Pro BNP Inpatient 165 0 - 900 pg/mL   UA with Microscopic reflex to Culture     Status: Abnormal    Specimen: Urine, Clean Catch   Result Value Ref Range    Color Urine Light Yellow Colorless, Straw, Light Yellow, Yellow    Appearance Urine Clear Clear    Glucose Urine Negative Negative mg/dL    Bilirubin Urine Negative Negative    Ketones Urine 40 (A)  Negative mg/dL    Specific Gravity Urine 1.009 1.003 - 1.035    Blood Urine Small (A) Negative    pH Urine 5.0 5.0 - 7.0    Protein Albumin Urine 10 (A) Negative mg/dL    Urobilinogen Urine Normal Normal, 2.0 mg/dL    Nitrite Urine Negative Negative    Leukocyte Esterase Urine Trace (A) Negative    Bacteria Urine Few (A) None Seen /HPF    Mucus Urine Present (A) None Seen /LPF    RBC Urine 1 <=2 /HPF    WBC Urine 1 <=5 /HPF    Squamous Epithelials Urine 1 <=1 /HPF    Hyaline Casts Urine 8 (H) <=2 /LPF    Narrative    Urine Culture not indicated   CBC with platelets and differential     Status: Abnormal   Result Value Ref Range    WBC Count 8.8 4.0 - 11.0 10e3/uL    RBC Count 4.78 3.80 - 5.20 10e6/uL    Hemoglobin 15.4 11.7 - 15.7 g/dL    Hematocrit 47.6 (H) 35.0 - 47.0 %     78 - 100 fL    MCH 32.2 26.5 - 33.0 pg    MCHC 32.4 31.5 - 36.5 g/dL    RDW 12.4 10.0 - 15.0 %    Platelet Count 255 150 - 450 10e3/uL    % Neutrophils 82 %    % Lymphocytes 10 %    % Monocytes 7 %    % Eosinophils 0 %    % Basophils 0 %    % Immature Granulocytes 1 %    NRBCs per 100 WBC 0 <1 /100    Absolute Neutrophils 7.2 1.6 - 8.3 10e3/uL    Absolute Lymphocytes 0.9 0.8 - 5.3 10e3/uL    Absolute Monocytes 0.6 0.0 - 1.3 10e3/uL    Absolute Eosinophils 0.0 0.0 - 0.7 10e3/uL    Absolute Basophils 0.0 0.0 - 0.2 10e3/uL    Absolute Immature Granulocytes 0.0 <=0.4 10e3/uL    Absolute NRBCs 0.0 10e3/uL   EKG 12 lead     Status: None (Preliminary result)   Result Value Ref Range    Systolic Blood Pressure  mmHg    Diastolic Blood Pressure  mmHg    Ventricular Rate 64 BPM    Atrial Rate 64 BPM    KS Interval 172 ms    QRS Duration 88 ms     ms    QTc 443 ms    P Axis 47 degrees    R AXIS -1 degrees    T Axis 49 degrees    Interpretation ECG       Sinus rhythm  Cannot rule out Inferior infarct , age undetermined  Abnormal ECG     CBC with platelets differential     Status: Abnormal    Narrative    The following orders were created for  panel order CBC with platelets differential.  Procedure                               Abnormality         Status                     ---------                               -----------         ------                     CBC with platelets and d...[[id number]]  Abnormal            Final result                 Please view results for these tests on the individual orders.     Medications   0.9% sodium chloride BOLUS (0 mLs Intravenous Stopped 6/30/23 1041)   adenosine (ADENOCARD) injection 12 mg (12 mg Intravenous Not Given 6/30/23 0930)   0.9% sodium chloride BOLUS (0 mLs Intravenous Stopped 6/30/23 1245)     Labs Ordered and Resulted from Time of ED Arrival to Time of ED Departure   COMPREHENSIVE METABOLIC PANEL - Abnormal       Result Value    Sodium 137      Potassium 3.9      Chloride 101      Carbon Dioxide (CO2) 21 (*)     Anion Gap 15      Urea Nitrogen 8.4      Creatinine 0.80      Calcium 9.3      Glucose 138 (*)     Alkaline Phosphatase 94      AST 24      ALT 23      Protein Total 7.4      Albumin 4.3      Bilirubin Total 0.4      GFR Estimate 81     LIPASE - Abnormal    Lipase 101 (*)    ROUTINE UA WITH MICROSCOPIC REFLEX TO CULTURE - Abnormal    Color Urine Light Yellow      Appearance Urine Clear      Glucose Urine Negative      Bilirubin Urine Negative      Ketones Urine 40 (*)     Specific Gravity Urine 1.009      Blood Urine Small (*)     pH Urine 5.0      Protein Albumin Urine 10 (*)     Urobilinogen Urine Normal      Nitrite Urine Negative      Leukocyte Esterase Urine Trace (*)     Bacteria Urine Few (*)     Mucus Urine Present (*)     RBC Urine 1      WBC Urine 1      Squamous Epithelials Urine 1      Hyaline Casts Urine 8 (*)    CBC WITH PLATELETS AND DIFFERENTIAL - Abnormal    WBC Count 8.8      RBC Count 4.78      Hemoglobin 15.4      Hematocrit 47.6 (*)           MCH 32.2      MCHC 32.4      RDW 12.4      Platelet Count 255      % Neutrophils 82      % Lymphocytes 10      % Monocytes 7       % Eosinophils 0      % Basophils 0      % Immature Granulocytes 1      NRBCs per 100 WBC 0      Absolute Neutrophils 7.2      Absolute Lymphocytes 0.9      Absolute Monocytes 0.6      Absolute Eosinophils 0.0      Absolute Basophils 0.0      Absolute Immature Granulocytes 0.0      Absolute NRBCs 0.0     INR - Normal    INR 0.94     PARTIAL THROMBOPLASTIN TIME - Normal    aPTT 25     MAGNESIUM - Normal    Magnesium 2.0     TROPONIN T, HIGH SENSITIVITY - Normal    Troponin T, High Sensitivity 9     TSH - Normal    TSH 1.17     NT PROBNP INPATIENT - Normal    N terminal Pro BNP Inpatient 165       US Abdomen Limited   Final Result   IMPRESSION:    Normal right upper quadrant ultrasound.      I have personally reviewed the examination and initial interpretation   and I agree with the findings.      FANTA PATTERSON MD            SYSTEM ID:  [id number]      XR Chest Port 1 View   Final Result   IMPRESSION:  Bibasilar predominant streaky pulmonary opacities, favors   atelectasis/pulmonary edema.      I have personally reviewed the examination and initial interpretation   and I agree with the findings.      KRISTINE ESTRADA MD            SYSTEM ID:  [id number]             Critical care was not performed.     Medical Decision Making  The patient's presentation was of high complexity (an acute health issue posing potential threat to life or bodily function).    The patient's evaluation involved:  an assessment requiring an independent historian (see separate area of note for details)  review of external note(s) from 3+ sources (see separate area of note for details)  ordering and/or review of 3+ test(s) in this encounter (see separate area of note for details)  review of 3+ test result(s) ordered prior to this encounter (see separate area of note for details)  discussion of management or test interpretation with another health professional (see separate area of note for details)    The patient's management necessitated high  risk (a decision regarding emergency major procedure (cardioversion)).      Assessment & Plan    Damon Rock is a 67 year old female with history of atrial flutter, essential tremor who presents from clinic as a rapid response for further evaluation of heart racing and prehospital EKG concerning for SVT.  Patient taken metoprolol earlier.  Still with ongoing symptoms was seen in neurology clinic with what appears to be an AV darci reentry tachycardia.  Patient transferred to the ER evaluated pressure stable but tachycardia patient given IV fluids has had diarrhea not concerns of C. difficile etc. no abdominal pain patient's labs otherwise stable lipase 101 other LFTs stable.  Here in the ER troponin negative EP cardiology consult came down patient converted back to sinus rhythm and has maintained received 2 L of fluid here in the ER feeling better right upper quadrant ultrasound was also done with patient's lipase is slightly elevated at this point patient feels fine and ultrasound was normal chest x-ray shows mild streaking but no sign of infection patient is point feels much better was then discharged home and referral to EP cardiology return if any concerns appears to be AV darci reentry tachycardia now resolved spontaneous.    Patient was seen by cardiology, they felt that this is an AV node reentry tachycardia.     I have reviewed the nursing notes. I have reviewed the findings, diagnosis, plan and need for follow up with the patient.    Discharge Medication List as of 6/30/2023  1:37 PM          Final diagnoses:   AVNRT (AV darci re-entry tachycardia) (H)   Dehydration   Diarrhea, unspecified type   Paroxysmal atrial flutter (H)     Keiry FAUSTIN, am serving as a trained medical scribe to document services personally performed by Charles George MD based on the provider's statements to me on June 30, 2023.  This document has been checked and approved by the attending provider.    Charles FAUSTIN  Ariel IRVIN, was physically present and have reviewed and verified the accuracy of this note documented by Keiry Xavier, medical scribe.      Charles George MD     [facility] EMERGENCY DEPARTMENT  6/30/2023    This note was created at least in part by the use of dragon voice dictation system. Inadvertent typographical errors may still exist.  Charles George MD.    Patient evaluated in the emergency department during the COVID-19 pandemic period. Careful attention to patients safety was addressed throughout the evaluation. Evaluation and treatment management was initiated with disposition made efficiently and appropriate as possible to minimize any risk of potential exposure to patient during this evaluation.       Charles George MD  06/30/23 1931

## 2023-07-03 LAB
ATRIAL RATE - MUSE: 127 BPM
ATRIAL RATE - MUSE: 147 BPM
ATRIAL RATE - MUSE: 64 BPM
DIASTOLIC BLOOD PRESSURE - MUSE: NORMAL MMHG
INTERPRETATION ECG - MUSE: NORMAL
P AXIS - MUSE: 47 DEGREES
P AXIS - MUSE: NORMAL DEGREES
P AXIS - MUSE: NORMAL DEGREES
PR INTERVAL - MUSE: 172 MS
PR INTERVAL - MUSE: NORMAL MS
PR INTERVAL - MUSE: NORMAL MS
QRS DURATION - MUSE: 120 MS
QRS DURATION - MUSE: 136 MS
QRS DURATION - MUSE: 88 MS
QT - MUSE: 310 MS
QT - MUSE: 328 MS
QT - MUSE: 430 MS
QTC - MUSE: 443 MS
QTC - MUSE: 476 MS
QTC - MUSE: 480 MS
R AXIS - MUSE: -1 DEGREES
R AXIS - MUSE: -2 DEGREES
R AXIS - MUSE: 10 DEGREES
SYSTOLIC BLOOD PRESSURE - MUSE: NORMAL MMHG
T AXIS - MUSE: 49 DEGREES
T AXIS - MUSE: 50 DEGREES
T AXIS - MUSE: 69 DEGREES
VENTRICULAR RATE- MUSE: 129 BPM
VENTRICULAR RATE- MUSE: 142 BPM
VENTRICULAR RATE- MUSE: 64 BPM

## 2023-07-12 RX ORDER — PRIMIDONE 50 MG/1
TABLET ORAL
Qty: 30 TABLET | Refills: 11 | Status: SHIPPED | OUTPATIENT
Start: 2023-07-12 | End: 2023-08-03

## 2023-07-26 ENCOUNTER — OFFICE VISIT (OUTPATIENT)
Dept: CARDIOLOGY | Facility: CLINIC | Age: 68
End: 2023-07-26
Attending: FAMILY MEDICINE
Payer: MEDICARE

## 2023-07-26 VITALS
HEART RATE: 66 BPM | BODY MASS INDEX: 28.17 KG/M2 | SYSTOLIC BLOOD PRESSURE: 107 MMHG | OXYGEN SATURATION: 97 % | WEIGHT: 159 LBS | DIASTOLIC BLOOD PRESSURE: 76 MMHG | RESPIRATION RATE: 16 BRPM

## 2023-07-26 DIAGNOSIS — I47.19 AVNRT (AV NODAL RE-ENTRY TACHYCARDIA) (H): ICD-10-CM

## 2023-07-26 DIAGNOSIS — I48.92 PAROXYSMAL ATRIAL FLUTTER (H): ICD-10-CM

## 2023-07-26 PROCEDURE — 99205 OFFICE O/P NEW HI 60 MIN: CPT | Performed by: INTERNAL MEDICINE

## 2023-07-26 NOTE — PROGRESS NOTES
[facility]  Cardiac Electrophysiology  [address]  [place] [address]   Office: [phone]  Fax: [phone]     Cardiac Electrophysiology Consultation    Patient: Damon Rock   : 1955     Referring Provider: Charles George MD  Primary Care Provider: Ari Jesus MD    CHIEF COMPLAINT/REASON FOR CONSULTATION  Supraventricular tachycardia    Assessment/Recommendations   Damon Rock is a 67 year old female with supraventricular tachycardia, essential tremor referred by Dr. George for consultation regarding SVT.    Supraventricular tachycardia - symptomatic with palpitations.  Likely AVNRT, though AVRT and AT are possible.  We reviewed SVT mechanisms and reviewed treatment options including electrophysiology study and ablation vs continued medical therapy.  We reviewed the nature of EP studies and ablation for SVT, success rates depending on inducibility and specific SVT mechanism, procedural risks (including groin hematoma, tamponade, heart block, stroke) and recovery expectations.  She would prefer catheter ablation  - EP study and ablation for SVT, MAC    Follow up: as above         History of Present Illness   Damon Rock is a 67 year old female with supraventricular tachycardia, essential tremor referred by Dr. George for consultation regarding SVT.    Mrs. Rock notes episodes of palpitations since and was diagnosed with SVT (reported to be atrial flutter - ECG shows short RP NCT 131bpm) 3/7/2021.  She has thereafter had approximately 4 episodes per year, though had noted 3 episodes in 2023.  She has taken metoprolol as needed, usually with episode termination approximately 2 hours thereafter. She had an ER visit 2023 with note of short RP NCT 140bpm - this terminated spontaneously to sinus rhythm.      She denies chest pain, syncope.  She is seen today with her partner Kaleb.       Physical Examination   Review of Systems   VITALS: /76 (BP Location: Right arm, Patient Position: Sitting, Cuff Size: Adult Large)   Pulse 66   Resp 16   Wt 72.1 kg (159 lb)   SpO2 97%   BMI 28.17 kg/m    Wt Readings from Last 3 Encounters:   06/30/23 71.2 kg (157 lb)   06/30/23 71.2 kg (157 lb)   09/29/22 72.1 kg (159 lb)     CONSTITUTIONAL: well nourished, comfortable, no distress  EYES:  Conjunctivae pink, sclerae clear.    E/N/T:  Oral mucosa pink  RESPIRATORY:  Respiratory effort is normal  CARDIOVASCULAR:  normal S1 and S2  GASTROINTESTINAL:  Abdomen without masses or tenderness  EXTREMITIES:  No clubbing or cyanosis.    MUSCULOSKELETAL:  Overall grossly normal muscle strength  SKIN:  Overall, skin warm and dry, no lesions.  NEURO/PSYCH:  Oriented x 3 with normal affect.   Constitutional:  No weight loss or loss of appetite    Eyes:  No difficulty with vision, no double vision, no dry eyes  ENT:  No sore throat, difficulty swallowing; changes in hearing or tinnitus  Cardiovascular: As detailed above  Respiratory:  No cough  Musculoskeletal  No joint pain, muscle aches  Neurologic:  No syncope, lightheadedness, fainting spells   Hematologic: No easy bruising, excessive bleeding tendency   Gastrointestinal:  No jaundice, abdominal pain or abdominal bloating  Genitourinary: No changes in urinary habits, no trouble urinating    Psychiatric: No anxiety or depression      Medical History  Surgical History   No past medical history on file. No past surgical history on file.      Family History Social History   Family History   Problem Relation Age of Onset    Heart Failure Mother 70.00        Social History     Tobacco Use    Smoking status: Former     Types: Cigarettes     Quit date: 1/1/2008     Years since quitting: 15.5    Smokeless tobacco: Never   Substance Use Topics    Alcohol use: Yes     Comment: 2 monthly    Drug use: Never         Medications  Allergies     Current Outpatient Medications:     ALPRAZolam (XANAX) 1 MG  tablet, [ALPRAZOLAM (XANAX) 1 MG TABLET] As needed, Disp: , Rfl:     atorvastatin (LIPITOR) 20 MG tablet, [ATORVASTATIN (LIPITOR) 20 MG TABLET] Take 20 mg by mouth daily. , Disp: , Rfl:     busPIRone (BUSPAR) 10 MG tablet, [BUSPIRONE (BUSPAR) 10 MG TABLET] TAKE 1 TABLET BY MOUTH TWICE DAILY FOR ANXIETY, Disp: , Rfl:     cholecalciferol (VITAMIN D3) 125 mcg (5000 units) capsule, Take 125 mcg by mouth daily, Disp: , Rfl:     FLUoxetine (PROZAC) 20 MG capsule, Take 20 mg by mouth daily, Disp: , Rfl:     metoprolol succinate ER (TOPROL XL) 25 MG 24 hr tablet, TAKE 1 TABLET BY MOUTH EVERY DAY FOR TREMORS, Disp: , Rfl:     primidone (MYSOLINE) 50 MG tablet, Week 1-2, take 0.5 tab at bedtime. Increase to 1 tab at bedtime thereafter., Disp: 30 tablet, Rfl: 11     Allergies   Allergen Reactions    Shellfish Derived [Shellfish-Derived Products] Anaphylaxis    Phenytoin Sodium Extended [Phenytoin] Other (See Comments)          Lab Results    Chemistry CBC Cardiac Enzymes/BNP/TSH/INR   Recent Labs   Lab Test 06/30/23 0913      POTASSIUM 3.9   CHLORIDE 101   CO2 21*   *   BUN 8.4   CR 0.80   GFRESTIMATED 81   SASHA 9.3     Recent Labs   Lab Test 06/30/23 0913 01/18/23  0941 07/18/22  1533   CR 0.80 0.86 0.81          Recent Labs   Lab Test 06/30/23 0913   WBC 8.8   HGB 15.4   HCT 47.6*           Recent Labs   Lab Test 06/30/23 0913   HGB 15.4    No results for input(s): TROPONINI in the last 88631 hours.  Recent Labs   Lab Test 06/30/23 0913   NTBNPI 165     Recent Labs   Lab Test 06/30/23 0913   TSH 1.17     Recent Labs   Lab Test 06/30/23 0913   INR 0.94         Data Review    ECGs (tracings independently reviewed)  6/30/2023 - SR 64bpm.  No evidence of ventricular pre-excitation, normal QTc, no epsilon waves, no Brugada pattern.  6/30/2023 - short RP NCT 119bpm  6/30/2023 - short RP NCT 142bpm    3/10/2021 - SR 57bpm  3/7/2021 - short RP NCT 131bpm      7/26/2023 TTE  1.Left ventricular size,  wall motion and function are normal. The ejection  fraction is 60-65%.  2.Normal right ventricle size and systolic function.  3.The aortic valve is trileaflet. There is mild (1+) aortic regurgitation.  Decel 1/2 time is 528 msec.  4.Normal size ascending aorta.  There is no comparison study available       Cc: Charles George MD, Dejuan Washington MD, Ari Jesus MD Amila Dilusha William, MD  7/26/2023  2:35 PM

## 2023-07-26 NOTE — LETTER
2023    Ari Jesus MD  [address]  [place] [address]    RE: Damon Rock       Dear Colleague,     I had the pleasure of seeing Damon Rock in the [facility].     [facility]  Cardiac Electrophysiology  [address]  [place] [address]   Office: [phone]  Fax: [phone]     Cardiac Electrophysiology Consultation    Patient: Damon Rock   : 1955     Referring Provider: Charles George MD  Primary Care Provider: Ari Jesus MD    CHIEF COMPLAINT/REASON FOR CONSULTATION  Supraventricular tachycardia    Assessment/Recommendations   Damon Rock is a 67 year old female with supraventricular tachycardia, essential tremor referred by Dr. George for consultation regarding SVT.    Supraventricular tachycardia - symptomatic with palpitations.  Likely AVNRT, though AVRT and AT are possible.  We reviewed SVT mechanisms and reviewed treatment options including electrophysiology study and ablation vs continued medical therapy.  We reviewed the nature of EP studies and ablation for SVT, success rates depending on inducibility and specific SVT mechanism, procedural risks (including groin hematoma, tamponade, heart block, stroke) and recovery expectations.  She would prefer catheter ablation  - EP study and ablation for SVT, MAC    Follow up: as above         History of Present Illness   Damon Rock is a 67 year old female with supraventricular tachycardia, essential tremor referred by Dr. George for consultation regarding SVT.    Mrs. Rock notes episodes of palpitations since and was diagnosed with SVT (reported to be atrial flutter - ECG shows short RP NCT 131bpm) 3/7/2021.  She has thereafter had approximately 4 episodes per year, though had noted 3 episodes in 2023.  She has taken metoprolol as needed, usually with episode termination approximately 2  hours thereafter. She had an ER visit 6/30/2023 with note of short RP NCT 140bpm - this terminated spontaneously to sinus rhythm.      She denies chest pain, syncope.  She is seen today with her partner Kaleb.       Physical Examination  Review of Systems   VITALS: /76 (BP Location: Right arm, Patient Position: Sitting, Cuff Size: Adult Large)   Pulse 66   Resp 16   Wt 72.1 kg (159 lb)   SpO2 97%   BMI 28.17 kg/m    Wt Readings from Last 3 Encounters:   06/30/23 71.2 kg (157 lb)   06/30/23 71.2 kg (157 lb)   09/29/22 72.1 kg (159 lb)     CONSTITUTIONAL: well nourished, comfortable, no distress  EYES:  Conjunctivae pink, sclerae clear.    E/N/T:  Oral mucosa pink  RESPIRATORY:  Respiratory effort is normal  CARDIOVASCULAR:  normal S1 and S2  GASTROINTESTINAL:  Abdomen without masses or tenderness  EXTREMITIES:  No clubbing or cyanosis.    MUSCULOSKELETAL:  Overall grossly normal muscle strength  SKIN:  Overall, skin warm and dry, no lesions.  NEURO/PSYCH:  Oriented x 3 with normal affect.   Constitutional:  No weight loss or loss of appetite    Eyes:  No difficulty with vision, no double vision, no dry eyes  ENT:  No sore throat, difficulty swallowing; changes in hearing or tinnitus  Cardiovascular: As detailed above  Respiratory:  No cough  Musculoskeletal  No joint pain, muscle aches  Neurologic:  No syncope, lightheadedness, fainting spells   Hematologic: No easy bruising, excessive bleeding tendency   Gastrointestinal:  No jaundice, abdominal pain or abdominal bloating  Genitourinary: No changes in urinary habits, no trouble urinating    Psychiatric: No anxiety or depression      Medical History  Surgical History   No past medical history on file. No past surgical history on file.      Family History Social History   Family History   Problem Relation Age of Onset    Heart Failure Mother 70.00        Social History     Tobacco Use    Smoking status: Former     Types: Cigarettes     Quit date: 1/1/2008      Years since quitting: 15.5    Smokeless tobacco: Never   Substance Use Topics    Alcohol use: Yes     Comment: 2 monthly    Drug use: Never         Medications  Allergies     Current Outpatient Medications:     ALPRAZolam (XANAX) 1 MG tablet, [ALPRAZOLAM (XANAX) 1 MG TABLET] As needed, Disp: , Rfl:     atorvastatin (LIPITOR) 20 MG tablet, [ATORVASTATIN (LIPITOR) 20 MG TABLET] Take 20 mg by mouth daily. , Disp: , Rfl:     busPIRone (BUSPAR) 10 MG tablet, [BUSPIRONE (BUSPAR) 10 MG TABLET] TAKE 1 TABLET BY MOUTH TWICE DAILY FOR ANXIETY, Disp: , Rfl:     cholecalciferol (VITAMIN D3) 125 mcg (5000 units) capsule, Take 125 mcg by mouth daily, Disp: , Rfl:     FLUoxetine (PROZAC) 20 MG capsule, Take 20 mg by mouth daily, Disp: , Rfl:     metoprolol succinate ER (TOPROL XL) 25 MG 24 hr tablet, TAKE 1 TABLET BY MOUTH EVERY DAY FOR TREMORS, Disp: , Rfl:     primidone (MYSOLINE) 50 MG tablet, Week 1-2, take 0.5 tab at bedtime. Increase to 1 tab at bedtime thereafter., Disp: 30 tablet, Rfl: 11     Allergies   Allergen Reactions    Shellfish Derived [Shellfish-Derived Products] Anaphylaxis    Phenytoin Sodium Extended [Phenytoin] Other (See Comments)          Lab Results    Chemistry CBC Cardiac Enzymes/BNP/TSH/INR   Recent Labs   Lab Test 06/30/23 0913      POTASSIUM 3.9   CHLORIDE 101   CO2 21*   *   BUN 8.4   CR 0.80   GFRESTIMATED 81   SASHA 9.3     Recent Labs   Lab Test 06/30/23 0913 01/18/23  0941 07/18/22  1533   CR 0.80 0.86 0.81          Recent Labs   Lab Test 06/30/23 0913   WBC 8.8   HGB 15.4   HCT 47.6*           Recent Labs   Lab Test 06/30/23 0913   HGB 15.4    No results for input(s): TROPONINI in the last 83003 hours.  Recent Labs   Lab Test 06/30/23 0913   NTBNPI 165     Recent Labs   Lab Test 06/30/23 0913   TSH 1.17     Recent Labs   Lab Test 06/30/23 0913   INR 0.94         Data Review    ECGs (tracings independently reviewed)  6/30/2023 - SR 64bpm.  No evidence of  ventricular pre-excitation, normal QTc, no epsilon waves, no Brugada pattern.  6/30/2023 - short RP NCT 119bpm  6/30/2023 - short RP NCT 142bpm    3/10/2021 - SR 57bpm  3/7/2021 - short RP NCT 131bpm      7/26/2023 TTE  1.Left ventricular size, wall motion and function are normal. The ejection  fraction is 60-65%.  2.Normal right ventricle size and systolic function.  3.The aortic valve is trileaflet. There is mild (1+) aortic regurgitation.  Decel 1/2 time is 528 msec.  4.Normal size ascending aorta.  There is no comparison study available       Cc: Charles George MD, Dejuan Washington MD, Ari Jesus MD Amila Dilusha William, MD  7/26/2023  2:35 PM        Thank you for allowing me to participate in the care of your patient.      Sincerely,     Flavia Toussaint MD     [facility]  cc:   Charles George MD  [address] [place] [address]

## 2023-07-26 NOTE — PATIENT INSTRUCTIONS
[facility]  Cardiac Electrophysiology  [address]  [place] [address]   Office: [phone]  Fax: [phone]       Thank you for seeing us in clinic today - it is a pleasure to be a part of your care team.  Below is a summary of our plan from today's visit.      You have had recurrent episodes of supraventricular tachycardia (SVT, as documented on ECGs 3/17/2021, 6/30/2023).  We reviewed SVT mechanisms and reviewed treatment options including electrophysiology study and ablation vs medical therapy vs observation.  We reviewed the nature of EP studies and ablation for SVT, success rates depending on inducibility and specific SVT mechanism, procedural risks and recovery expectations.  We will plan for the following:  - our office will work with you to coordinate an ablation for SVT  - continue use of metoprolol as needed in interim    Please do not hesitate to be in touch with our office at [phone] with any questions that may arise.      Thank you for trusting us with your care,    Flavia Toussaint MD  Clinical Cardiac Electrophysiology  [facility]  [address]  [place] [address]   Office: [phone]  Fax: [phone]

## 2023-07-27 ENCOUNTER — PREP FOR PROCEDURE (OUTPATIENT)
Dept: CARDIOLOGY | Facility: CLINIC | Age: 68
End: 2023-07-27
Payer: MEDICARE

## 2023-07-27 ENCOUNTER — DOCUMENTATION ONLY (OUTPATIENT)
Dept: CARDIOLOGY | Facility: CLINIC | Age: 68
End: 2023-07-27
Payer: MEDICARE

## 2023-07-27 DIAGNOSIS — I47.19 AVNRT (AV NODAL RE-ENTRY TACHYCARDIA) (H): Primary | ICD-10-CM

## 2023-07-27 RX ORDER — SODIUM CHLORIDE 9 MG/ML
100 INJECTION, SOLUTION INTRAVENOUS CONTINUOUS
Status: CANCELLED | OUTPATIENT
Start: 2023-09-26

## 2023-07-27 RX ORDER — LIDOCAINE 40 MG/G
CREAM TOPICAL
Status: CANCELLED | OUTPATIENT
Start: 2023-07-27

## 2023-07-27 RX ORDER — FENTANYL CITRATE 50 UG/ML
25 INJECTION, SOLUTION INTRAMUSCULAR; INTRAVENOUS
Status: CANCELLED | OUTPATIENT
Start: 2023-09-26

## 2023-07-27 NOTE — PROGRESS NOTES
H&P  PMD: []  Received [] Card OV: [x]  Date: 7/26/23 Teach  []   Orders  I [x] P  [x]  Letter []   AC: None- NA AAD: Metoprolol-Continue takes for tremors    All other AM Meds: Take All     1955  Home:[phone] (home) Cell:[phone] (mobile)  Emergency Contact: CHRISTINE STOCKTON   PCP: Ari Jesus, [phone]    Important patient information for CSC/Cath Lab staff : None    [facility] EP Cath Lab Procedure Order   Ablation Type:Supraventricular Tachycardia  Ordering Provider: Dr Toussaint  Ordering Date: 7/27/2023  Diagnosis:  SVT  Anticipated Case Duration:  Standard ( Case per day SA 2:1, DW 4:1, KA 3:1)   Scheduling Timeframe:  Next Available  Scheduling Restrictions: None  Scheduling Contact: Please contact pt to schedule, if you are unable to schedule date within the next 24 hours please contact pt to update on scheduling process  EP RN Follow Up Apt: Dr Apple or Dr Toussaint Ablations, do NOT need RN PC follow-up post procedure. Dr Zavala's SVT, AFL, and PVC ablations need 3-4 day EP RN PC post procedure.  Cardiology Follow Up Apt s/p: AVN-General Card @ 6mo + if EF <50 or dx of HF HF DOM at time of device PO or if EF >50% no dx of HF EP DOM at time of device PO, SVT/AFL- EP DOM @ 6 wks and General Card @ 6mo, VT- EP MD @ 6 wks, PVC- Dr Toussaint 1 wk MCTO/Zio @ 4 wks and Dr Zavala 24 hr Holter @ 4 wks with EP MD @ wks for both  Current Device/Device Co Needed for Procedure: None NoneNone  Pre-Procedural Testing needed: None  Mapping System Required:  Carto (Lucas Toussaint)  ICE Needed:  Yes  Anesthesia:  MAC- Monitored Anesthesia Care    [facility] EP Cath Lab Prep   H&P:  Compled by cardiology on 7/26/23 if scheduled within 30 days, pt to schedule with PMD if procedure outside of this timeframe  Pre-op Labs: CBC, BMP, Beta HcG if appropriate, and INR if on Warfarin will be ordered AM of procedure and Review of most recent labs, WEL for procedure  T&S Pre-Procedure Review: T&S is not required  for procedure  Medical Records Pertinent for Procedure:  Echo 7/26 EF 60%  Allergies: Reviewed allergies, no concerns regarding orders for procedure    Allergies   Allergen Reactions    Shellfish Derived [Shellfish-Derived Products] Anaphylaxis    Phenytoin Sodium Extended [Phenytoin] Other (See Comments)       Current Outpatient Medications:     ALPRAZolam (XANAX) 1 MG tablet, [ALPRAZOLAM (XANAX) 1 MG TABLET] As needed, Disp: , Rfl:     atorvastatin (LIPITOR) 20 MG tablet, [ATORVASTATIN (LIPITOR) 20 MG TABLET] Take 20 mg by mouth daily. , Disp: , Rfl:     busPIRone (BUSPAR) 10 MG tablet, [BUSPIRONE (BUSPAR) 10 MG TABLET] TAKE 1 TABLET BY MOUTH TWICE DAILY FOR ANXIETY, Disp: , Rfl:     cholecalciferol (VITAMIN D3) 125 mcg (5000 units) capsule, Take 125 mcg by mouth daily, Disp: , Rfl:     FLUoxetine (PROZAC) 20 MG capsule, Take 20 mg by mouth daily, Disp: , Rfl:     metoprolol succinate ER (TOPROL XL) 25 MG 24 hr tablet, TAKE 1 TABLET BY MOUTH EVERY DAY FOR TREMORS, Disp: , Rfl:     primidone (MYSOLINE) 50 MG tablet, Week 1-2, take 0.5 tab at bedtime. Increase to 1 tab at bedtime thereafter. (Patient not taking: Reported on 7/26/2023), Disp: 30 tablet, Rfl: 11    Documentation Date:7/27/2023 7:46 AM  Paloma James RN

## 2023-08-03 RX ORDER — GABAPENTIN 100 MG/1
CAPSULE ORAL
Qty: 90 CAPSULE | Refills: 11 | Status: ON HOLD | OUTPATIENT
Start: 2023-08-03 | End: 2023-09-26

## 2023-08-23 ENCOUNTER — MYC MEDICAL ADVICE (OUTPATIENT)
Dept: NEUROLOGY | Facility: CLINIC | Age: 68
End: 2023-08-23
Payer: MEDICARE

## 2023-09-13 ENCOUNTER — TRANSFERRED RECORDS (OUTPATIENT)
Dept: HEALTH INFORMATION MANAGEMENT | Facility: CLINIC | Age: 68
End: 2023-09-13

## 2023-09-13 ENCOUNTER — LAB REQUISITION (OUTPATIENT)
Dept: LAB | Facility: CLINIC | Age: 68
End: 2023-09-13
Payer: MEDICARE

## 2023-09-13 DIAGNOSIS — Z91.89 OTHER SPECIFIED PERSONAL RISK FACTORS, NOT ELSEWHERE CLASSIFIED: ICD-10-CM

## 2023-09-13 DIAGNOSIS — E78.2 MIXED HYPERLIPIDEMIA: ICD-10-CM

## 2023-09-13 DIAGNOSIS — I47.10 SUPRAVENTRICULAR TACHYCARDIA (H): ICD-10-CM

## 2023-09-13 LAB
BASOPHILS # BLD AUTO: 0 10E3/UL (ref 0–0.2)
BASOPHILS NFR BLD AUTO: 1 %
EOSINOPHIL # BLD AUTO: 0.1 10E3/UL (ref 0–0.7)
EOSINOPHIL NFR BLD AUTO: 2 %
ERYTHROCYTE [DISTWIDTH] IN BLOOD BY AUTOMATED COUNT: 12.9 % (ref 10–15)
HCT VFR BLD AUTO: 46.2 % (ref 35–47)
HGB BLD-MCNC: 15 G/DL (ref 11.7–15.7)
IMM GRANULOCYTES # BLD: 0 10E3/UL
IMM GRANULOCYTES NFR BLD: 0 %
LYMPHOCYTES # BLD AUTO: 1.1 10E3/UL (ref 0.8–5.3)
LYMPHOCYTES NFR BLD AUTO: 25 %
MCH RBC QN AUTO: 31.6 PG (ref 26.5–33)
MCHC RBC AUTO-ENTMCNC: 32.5 G/DL (ref 31.5–36.5)
MCV RBC AUTO: 98 FL (ref 78–100)
MONOCYTES # BLD AUTO: 0.5 10E3/UL (ref 0–1.3)
MONOCYTES NFR BLD AUTO: 11 %
NEUTROPHILS # BLD AUTO: 2.8 10E3/UL (ref 1.6–8.3)
NEUTROPHILS NFR BLD AUTO: 61 %
NRBC # BLD AUTO: 0 10E3/UL
NRBC BLD AUTO-RTO: 0 /100
PLATELET # BLD AUTO: 243 10E3/UL (ref 150–450)
RBC # BLD AUTO: 4.74 10E6/UL (ref 3.8–5.2)
WBC # BLD AUTO: 4.6 10E3/UL (ref 4–11)

## 2023-09-13 PROCEDURE — 80061 LIPID PANEL: CPT | Mod: ORL | Performed by: FAMILY MEDICINE

## 2023-09-13 PROCEDURE — 85025 COMPLETE CBC W/AUTO DIFF WBC: CPT | Mod: ORL | Performed by: FAMILY MEDICINE

## 2023-09-13 PROCEDURE — 80053 COMPREHEN METABOLIC PANEL: CPT | Mod: ORL | Performed by: FAMILY MEDICINE

## 2023-09-13 PROCEDURE — 82306 VITAMIN D 25 HYDROXY: CPT | Mod: ORL | Performed by: FAMILY MEDICINE

## 2023-09-14 LAB
ALBUMIN SERPL BCG-MCNC: 4.3 G/DL (ref 3.5–5.2)
ALP SERPL-CCNC: 116 U/L (ref 35–104)
ALT SERPL W P-5'-P-CCNC: 50 U/L (ref 0–50)
ANION GAP SERPL CALCULATED.3IONS-SCNC: 12 MMOL/L (ref 7–15)
AST SERPL W P-5'-P-CCNC: 37 U/L (ref 0–45)
BILIRUB SERPL-MCNC: 0.3 MG/DL
BUN SERPL-MCNC: 12.4 MG/DL (ref 8–23)
CALCIUM SERPL-MCNC: 9.4 MG/DL (ref 8.8–10.2)
CHLORIDE SERPL-SCNC: 100 MMOL/L (ref 98–107)
CHOLEST SERPL-MCNC: 198 MG/DL
CREAT SERPL-MCNC: 0.86 MG/DL (ref 0.51–0.95)
DEPRECATED CALCIDIOL+CALCIFEROL SERPL-MC: 76 UG/L (ref 20–75)
DEPRECATED HCO3 PLAS-SCNC: 25 MMOL/L (ref 22–29)
EGFRCR SERPLBLD CKD-EPI 2021: 74 ML/MIN/1.73M2
GLUCOSE SERPL-MCNC: 91 MG/DL (ref 70–99)
HDLC SERPL-MCNC: 67 MG/DL
LDLC SERPL CALC-MCNC: 101 MG/DL
NONHDLC SERPL-MCNC: 131 MG/DL
POTASSIUM SERPL-SCNC: 4.5 MMOL/L (ref 3.4–5.3)
PROT SERPL-MCNC: 7.3 G/DL (ref 6.4–8.3)
SODIUM SERPL-SCNC: 137 MMOL/L (ref 136–145)
TRIGL SERPL-MCNC: 148 MG/DL

## 2023-09-18 ENCOUNTER — TELEPHONE (OUTPATIENT)
Dept: CARDIOLOGY | Facility: CLINIC | Age: 68
End: 2023-09-18
Payer: MEDICARE

## 2023-09-18 NOTE — TELEPHONE ENCOUNTER
Pre-Procedure Education    Procedure: SVT Abaltion with Dr Toussaint on 9/26 with arrival time 8:30 am    COVID: COVID policy- if pt develops COVID like symptoms prior to procedure, he/she would need to complete an at home with a rapid antigen COVID test 1-2 days prior to your procedure date. If COVID + pt is aware the procedure will need to be rescheduled, and to contact CV scheduling as soon as possible    Pre-Op H&P: Will need to verify that pt has pre-op scheduled with PMD, as apt/record of pre-op not listed in EPIC- Will request upon completion    Education:   Contact:  attempted to contact patient, unable to leave a voicemail will need to attempt again  Pre-Procedure Instruction: NPO after midnight pre procedure, Defined NPO with pt, Remove all jewelry and leave all valuables at home, Shower prior to arrival, Sedation plan/orders, Transportation requirements and arrangements post procedure, Post-procedure follow up process, Post-procedure restrictions/expectations, and Pre-procedure letter sent- letter tab  Risks:  Cardiac Catheter Ablation  <1% risk for the following: hypotension, hemorrhage, vascular injury including perforation of vein, artery or heart, thrombophlebitis, systemic or pulmonic emboli; cardiac perforation, (tamponade), infection, pneumothorax, arrhythmias, proarrhythmic effects of drugs, radiation exposure.  1-2% complete heart block (for AVNRT or septol accessory pathway).  <0.5% CVA or MI  <0.1% death  If external defibrillation is needed, 75% risk for superficial burn.  1-2% tamponade and aortic puncture with left sided transeptal approach for left side BOGDAN - increase risk of CVA to <2%.  Late arrhythmia recurrences depends upon the primary rhythm disturbance.      Medication:   Instructions regarding anticoagulants: Pt not on AC- N/A  Instructions regarding antiarrhythmic medication: Beta Blocker; Pt instructed to continue medication prior to procedure as prescribed  Instructions for  medication, other than anticoagulants and antiarrhythmics listed above, given to pt: to take all morning medications with small sips of water, with the exception of OTC supplements and MVI     Important patient information for staff: None    9/18/2023 3:40 PM  Paloma Ray RN

## 2023-09-19 NOTE — TELEPHONE ENCOUNTER
Phone call to patient, unable to leave message.  Phone call to other number listed, pt not available but left a message with her significant other for return call.

## 2023-09-20 NOTE — TELEPHONE ENCOUNTER
Phone call back from pt, discussed education below.    H&P completed at [facility] with Dr. Jesus on 9/13/23 and is viewable in care everywhere.    No further questions or concerns at this time.    Jeannie

## 2023-09-26 ENCOUNTER — ANESTHESIA EVENT (OUTPATIENT)
Dept: CARDIOLOGY | Facility: HOSPITAL | Age: 68
End: 2023-09-26
Payer: MEDICARE

## 2023-09-26 ENCOUNTER — HOSPITAL ENCOUNTER (OUTPATIENT)
Facility: HOSPITAL | Age: 68
Discharge: HOME | End: 2023-09-26
Attending: INTERNAL MEDICINE | Admitting: INTERNAL MEDICINE
Payer: MEDICARE

## 2023-09-26 ENCOUNTER — ANESTHESIA (OUTPATIENT)
Dept: CARDIOLOGY | Facility: HOSPITAL | Age: 68
End: 2023-09-26
Payer: MEDICARE

## 2023-09-26 VITALS
HEART RATE: 60 BPM | RESPIRATION RATE: 16 BRPM | BODY MASS INDEX: 29.44 KG/M2 | TEMPERATURE: 98 F | SYSTOLIC BLOOD PRESSURE: 111 MMHG | HEIGHT: 62 IN | OXYGEN SATURATION: 95 % | WEIGHT: 160 LBS | DIASTOLIC BLOOD PRESSURE: 63 MMHG

## 2023-09-26 DIAGNOSIS — I47.19 AVNRT (AV NODAL RE-ENTRY TACHYCARDIA) (H): ICD-10-CM

## 2023-09-26 LAB
ANION GAP SERPL CALCULATED.3IONS-SCNC: 8 MMOL/L (ref 7–15)
ATRIAL RATE - MUSE: 58 BPM
BUN SERPL-MCNC: 11.2 MG/DL (ref 8–23)
CALCIUM SERPL-MCNC: 9.1 MG/DL (ref 8.8–10.2)
CHLORIDE SERPL-SCNC: 103 MMOL/L (ref 98–107)
CREAT SERPL-MCNC: 0.89 MG/DL (ref 0.51–0.95)
DEPRECATED HCO3 PLAS-SCNC: 28 MMOL/L (ref 22–29)
DIASTOLIC BLOOD PRESSURE - MUSE: NORMAL MMHG
EGFRCR SERPLBLD CKD-EPI 2021: 71 ML/MIN/1.73M2
ERYTHROCYTE [DISTWIDTH] IN BLOOD BY AUTOMATED COUNT: 12.6 % (ref 10–15)
GLUCOSE SERPL-MCNC: 104 MG/DL (ref 70–99)
HCT VFR BLD AUTO: 44.6 % (ref 35–47)
HGB BLD-MCNC: 14.1 G/DL (ref 11.7–15.7)
INTERPRETATION ECG - MUSE: NORMAL
MCH RBC QN AUTO: 31.6 PG (ref 26.5–33)
MCHC RBC AUTO-ENTMCNC: 31.6 G/DL (ref 31.5–36.5)
MCV RBC AUTO: 100 FL (ref 78–100)
P AXIS - MUSE: 52 DEGREES
PLATELET # BLD AUTO: 211 10E3/UL (ref 150–450)
POTASSIUM SERPL-SCNC: 3.8 MMOL/L (ref 3.4–5.3)
PR INTERVAL - MUSE: 182 MS
QRS DURATION - MUSE: 88 MS
QT - MUSE: 474 MS
QTC - MUSE: 465 MS
R AXIS - MUSE: -9 DEGREES
RBC # BLD AUTO: 4.46 10E6/UL (ref 3.8–5.2)
SODIUM SERPL-SCNC: 139 MMOL/L (ref 136–145)
SYSTOLIC BLOOD PRESSURE - MUSE: NORMAL MMHG
T AXIS - MUSE: 28 DEGREES
VENTRICULAR RATE- MUSE: 58 BPM
WBC # BLD AUTO: 4.9 10E3/UL (ref 4–11)

## 2023-09-26 PROCEDURE — 80048 BASIC METABOLIC PNL TOTAL CA: CPT | Performed by: INTERNAL MEDICINE

## 2023-09-26 PROCEDURE — C1732 CATH, EP, DIAG/ABL, 3D/VECT: HCPCS | Performed by: INTERNAL MEDICINE

## 2023-09-26 PROCEDURE — 370N000017 HC ANESTHESIA TECHNICAL FEE, PER MIN: Performed by: INTERNAL MEDICINE

## 2023-09-26 PROCEDURE — 250N000009 HC RX 250: Performed by: INTERNAL MEDICINE

## 2023-09-26 PROCEDURE — 258N000003 HC RX IP 258 OP 636: Performed by: INTERNAL MEDICINE

## 2023-09-26 PROCEDURE — 272N000001 HC OR GENERAL SUPPLY STERILE: Performed by: INTERNAL MEDICINE

## 2023-09-26 PROCEDURE — 85027 COMPLETE CBC AUTOMATED: CPT | Performed by: INTERNAL MEDICINE

## 2023-09-26 PROCEDURE — 93623 PRGRMD STIMJ&PACG IV RX NFS: CPT | Mod: 26 | Performed by: INTERNAL MEDICINE

## 2023-09-26 PROCEDURE — 93653 COMPRE EP EVAL TX SVT: CPT | Performed by: INTERNAL MEDICINE

## 2023-09-26 PROCEDURE — C1730 CATH, EP, 19 OR FEW ELECT: HCPCS | Performed by: INTERNAL MEDICINE

## 2023-09-26 PROCEDURE — 999N000054 HC STATISTIC EKG NON-CHARGEABLE

## 2023-09-26 PROCEDURE — 258N000003 HC RX IP 258 OP 636: Performed by: NURSE ANESTHETIST, CERTIFIED REGISTERED

## 2023-09-26 PROCEDURE — 93010 ELECTROCARDIOGRAM REPORT: CPT | Mod: HOP | Performed by: INTERNAL MEDICINE

## 2023-09-26 PROCEDURE — 36415 COLL VENOUS BLD VENIPUNCTURE: CPT | Performed by: INTERNAL MEDICINE

## 2023-09-26 PROCEDURE — 250N000011 HC RX IP 250 OP 636: Performed by: NURSE ANESTHETIST, CERTIFIED REGISTERED

## 2023-09-26 PROCEDURE — 93623 PRGRMD STIMJ&PACG IV RX NFS: CPT | Performed by: INTERNAL MEDICINE

## 2023-09-26 PROCEDURE — 93005 ELECTROCARDIOGRAM TRACING: CPT

## 2023-09-26 PROCEDURE — C1733 CATH, EP, OTHR THAN COOL-TIP: HCPCS | Performed by: INTERNAL MEDICINE

## 2023-09-26 RX ORDER — PROPOFOL 10 MG/ML
INJECTION, EMULSION INTRAVENOUS PRN
Status: DISCONTINUED | OUTPATIENT
Start: 2023-09-26 | End: 2023-09-26

## 2023-09-26 RX ORDER — FENTANYL CITRATE 50 UG/ML
INJECTION, SOLUTION INTRAMUSCULAR; INTRAVENOUS PRN
Status: DISCONTINUED | OUTPATIENT
Start: 2023-09-26 | End: 2023-09-26

## 2023-09-26 RX ORDER — LIDOCAINE 40 MG/G
CREAM TOPICAL
Status: DISCONTINUED | OUTPATIENT
Start: 2023-09-26 | End: 2023-09-26 | Stop reason: HOSPADM

## 2023-09-26 RX ORDER — GABAPENTIN 100 MG/1
100 CAPSULE ORAL 3 TIMES DAILY
COMMUNITY
End: 2024-01-26

## 2023-09-26 RX ORDER — ONDANSETRON 4 MG/1
4 TABLET, ORALLY DISINTEGRATING ORAL EVERY 6 HOURS PRN
Status: DISCONTINUED | OUTPATIENT
Start: 2023-09-26 | End: 2023-09-26 | Stop reason: HOSPADM

## 2023-09-26 RX ORDER — SODIUM CHLORIDE 9 MG/ML
100 INJECTION, SOLUTION INTRAVENOUS CONTINUOUS
Status: DISCONTINUED | OUTPATIENT
Start: 2023-09-26 | End: 2023-09-26 | Stop reason: HOSPADM

## 2023-09-26 RX ORDER — HYDROMORPHONE HYDROCHLORIDE 1 MG/ML
0.2 INJECTION, SOLUTION INTRAMUSCULAR; INTRAVENOUS; SUBCUTANEOUS EVERY 5 MIN PRN
Status: DISCONTINUED | OUTPATIENT
Start: 2023-09-26 | End: 2023-09-26 | Stop reason: HOSPADM

## 2023-09-26 RX ORDER — NALOXONE HYDROCHLORIDE 0.4 MG/ML
0.4 INJECTION, SOLUTION INTRAMUSCULAR; INTRAVENOUS; SUBCUTANEOUS
Status: DISCONTINUED | OUTPATIENT
Start: 2023-09-26 | End: 2023-09-26 | Stop reason: HOSPADM

## 2023-09-26 RX ORDER — SODIUM CHLORIDE 9 MG/ML
INJECTION, SOLUTION INTRAVENOUS CONTINUOUS PRN
Status: DISCONTINUED | OUTPATIENT
Start: 2023-09-26 | End: 2023-09-26

## 2023-09-26 RX ORDER — ONDANSETRON 2 MG/ML
4 INJECTION INTRAMUSCULAR; INTRAVENOUS EVERY 30 MIN PRN
Status: DISCONTINUED | OUTPATIENT
Start: 2023-09-26 | End: 2023-09-26 | Stop reason: HOSPADM

## 2023-09-26 RX ORDER — OXYCODONE HYDROCHLORIDE 5 MG/1
10 TABLET ORAL
Status: DISCONTINUED | OUTPATIENT
Start: 2023-09-26 | End: 2023-09-26 | Stop reason: HOSPADM

## 2023-09-26 RX ORDER — NALOXONE HYDROCHLORIDE 0.4 MG/ML
0.2 INJECTION, SOLUTION INTRAMUSCULAR; INTRAVENOUS; SUBCUTANEOUS
Status: DISCONTINUED | OUTPATIENT
Start: 2023-09-26 | End: 2023-09-26 | Stop reason: HOSPADM

## 2023-09-26 RX ORDER — IBUPROFEN 600 MG/1
600 TABLET, FILM COATED ORAL EVERY 6 HOURS PRN
Status: DISCONTINUED | OUTPATIENT
Start: 2023-09-26 | End: 2023-09-26 | Stop reason: HOSPADM

## 2023-09-26 RX ORDER — SODIUM CHLORIDE, SODIUM LACTATE, POTASSIUM CHLORIDE, CALCIUM CHLORIDE 600; 310; 30; 20 MG/100ML; MG/100ML; MG/100ML; MG/100ML
INJECTION, SOLUTION INTRAVENOUS CONTINUOUS
Status: DISCONTINUED | OUTPATIENT
Start: 2023-09-26 | End: 2023-09-26 | Stop reason: HOSPADM

## 2023-09-26 RX ORDER — OXYCODONE HYDROCHLORIDE 5 MG/1
5 TABLET ORAL
Status: DISCONTINUED | OUTPATIENT
Start: 2023-09-26 | End: 2023-09-26 | Stop reason: HOSPADM

## 2023-09-26 RX ORDER — PROPOFOL 10 MG/ML
INJECTION, EMULSION INTRAVENOUS CONTINUOUS PRN
Status: DISCONTINUED | OUTPATIENT
Start: 2023-09-26 | End: 2023-09-26

## 2023-09-26 RX ORDER — ONDANSETRON 4 MG/1
4 TABLET, ORALLY DISINTEGRATING ORAL EVERY 30 MIN PRN
Status: DISCONTINUED | OUTPATIENT
Start: 2023-09-26 | End: 2023-09-26 | Stop reason: HOSPADM

## 2023-09-26 RX ORDER — ACETAMINOPHEN 325 MG/1
650 TABLET ORAL EVERY 4 HOURS PRN
Status: DISCONTINUED | OUTPATIENT
Start: 2023-09-26 | End: 2023-09-26 | Stop reason: HOSPADM

## 2023-09-26 RX ORDER — FENTANYL CITRATE 50 UG/ML
25 INJECTION, SOLUTION INTRAMUSCULAR; INTRAVENOUS
Status: DISCONTINUED | OUTPATIENT
Start: 2023-09-26 | End: 2023-09-26 | Stop reason: HOSPADM

## 2023-09-26 RX ORDER — HYDROMORPHONE HYDROCHLORIDE 1 MG/ML
0.4 INJECTION, SOLUTION INTRAMUSCULAR; INTRAVENOUS; SUBCUTANEOUS EVERY 5 MIN PRN
Status: DISCONTINUED | OUTPATIENT
Start: 2023-09-26 | End: 2023-09-26 | Stop reason: HOSPADM

## 2023-09-26 RX ORDER — FENTANYL CITRATE 50 UG/ML
25 INJECTION, SOLUTION INTRAMUSCULAR; INTRAVENOUS EVERY 5 MIN PRN
Status: DISCONTINUED | OUTPATIENT
Start: 2023-09-26 | End: 2023-09-26 | Stop reason: HOSPADM

## 2023-09-26 RX ORDER — FENTANYL CITRATE 50 UG/ML
50 INJECTION, SOLUTION INTRAMUSCULAR; INTRAVENOUS EVERY 5 MIN PRN
Status: DISCONTINUED | OUTPATIENT
Start: 2023-09-26 | End: 2023-09-26 | Stop reason: HOSPADM

## 2023-09-26 RX ORDER — DEXAMETHASONE SODIUM PHOSPHATE 10 MG/ML
INJECTION, SOLUTION INTRAMUSCULAR; INTRAVENOUS PRN
Status: DISCONTINUED | OUTPATIENT
Start: 2023-09-26 | End: 2023-09-26

## 2023-09-26 RX ORDER — ONDANSETRON 2 MG/ML
4 INJECTION INTRAMUSCULAR; INTRAVENOUS EVERY 6 HOURS PRN
Status: DISCONTINUED | OUTPATIENT
Start: 2023-09-26 | End: 2023-09-26 | Stop reason: HOSPADM

## 2023-09-26 RX ADMIN — FENTANYL CITRATE 50 MCG: 50 INJECTION, SOLUTION INTRAMUSCULAR; INTRAVENOUS at 12:07

## 2023-09-26 RX ADMIN — MIDAZOLAM 2 MG: 1 INJECTION INTRAMUSCULAR; INTRAVENOUS at 12:07

## 2023-09-26 RX ADMIN — PROPOFOL 50 MG: 10 INJECTION, EMULSION INTRAVENOUS at 12:07

## 2023-09-26 RX ADMIN — DEXAMETHASONE SODIUM PHOSPHATE 10 MG: 10 INJECTION, SOLUTION INTRAMUSCULAR; INTRAVENOUS at 12:19

## 2023-09-26 RX ADMIN — SODIUM CHLORIDE 100 ML/HR: 9 INJECTION, SOLUTION INTRAVENOUS at 09:58

## 2023-09-26 RX ADMIN — PROPOFOL 30 MG: 10 INJECTION, EMULSION INTRAVENOUS at 12:09

## 2023-09-26 RX ADMIN — PROPOFOL 50 MCG/KG/MIN: 10 INJECTION, EMULSION INTRAVENOUS at 12:07

## 2023-09-26 RX ADMIN — SODIUM CHLORIDE: 9 INJECTION, SOLUTION INTRAVENOUS at 12:01

## 2023-09-26 ASSESSMENT — ACTIVITIES OF DAILY LIVING (ADL)
ADLS_ACUITY_SCORE: 35

## 2023-09-26 ASSESSMENT — ENCOUNTER SYMPTOMS: DYSRHYTHMIAS: 1

## 2023-09-26 NOTE — Clinical Note
[employer] system 12 lead EKG, hemodynamics 5 lead, pulse oximetery, NIBP, [employer] hands off defibrillator/external pacer, with 3 monitoring leads to patient. Baseline assessment done.

## 2023-09-26 NOTE — ANESTHESIA POSTPROCEDURE EVALUATION
Patient: Damon Rock    Procedure: Procedure(s):  Ablation Supraventricular Tachycardia       Anesthesia Type:  MAC    Note:  Disposition: Outpatient   Postop Pain Control: Uneventful            Sign Out: Well controlled pain   PONV: No   Neuro/Psych: Uneventful            Sign Out: Acceptable/Baseline neuro status   Airway/Respiratory: Uneventful            Sign Out: Acceptable/Baseline resp. status   CV/Hemodynamics: Uneventful            Sign Out: Acceptable CV status; No obvious hypovolemia; No obvious fluid overload   Other NRE: NONE   DID A NON-ROUTINE EVENT OCCUR? No           Last vitals:  Vitals Value Taken Time   /68 09/26/23 1530   Temp 36.7  C (98  F) 09/26/23 1330   Pulse 65 09/26/23 1531   Resp 61 09/26/23 1531   SpO2 97 % 09/26/23 1531   Vitals shown include unvalidated device data.    Electronically Signed By: Messi Buck MD  September 26, 2023  3:58 PM

## 2023-09-26 NOTE — ANESTHESIA CARE TRANSFER NOTE
Patient: Damon Rock    Procedure: Procedure(s):  Ablation Supraventricular Tachycardia       Diagnosis: SVT  Diagnosis Additional Information: No value filed.    Anesthesia Type:   MAC     Note:    Oropharynx: oropharynx clear of all foreign objects and spontaneously breathing  Level of Consciousness: awake  Oxygen Supplementation: room air    Independent Airway: airway patency satisfactory and stable  Dentition: dentition unchanged  Vital Signs Stable: post-procedure vital signs reviewed and stable  Report to RN Given: handoff report given  Patient transferred to: PACU    Handoff Report: Identifed the Patient, Identified the Reponsible Provider, Reviewed the pertinent medical history, Discussed the surgical course, Reviewed Intra-OP anesthesia mangement and issues during anesthesia, Set expectations for post-procedure period and Allowed opportunity for questions and acknowledgement of understanding      Vitals:  Vitals Value Taken Time   BP     Temp     Pulse 63 09/26/23 1325   Resp 24 09/26/23 1325   SpO2 97 % 09/26/23 1325   Vitals shown include unvalidated device data.    Electronically Signed By: REYNA Jensen CRNA  September 26, 2023  1:27 PM

## 2023-09-26 NOTE — INTERVAL H&P NOTE
"I have reviewed the surgical (or preoperative) H&P that is linked to this encounter, and examined the patient. There are no significant changes    Clinical Conditions Present on Arrival:  Clinically Significant Risk Factors Present on Admission                  # Overweight: Estimated body mass index is 29.26 kg/m  as calculated from the following:    Height as of this encounter: 1.575 m (5' 2\").    Weight as of this encounter: 72.6 kg (160 lb).       "

## 2023-09-26 NOTE — ANESTHESIA PREPROCEDURE EVALUATION
Anesthesia Pre-Procedure Evaluation    Patient: Damon Rock   MRN: [id number] : 1955        Procedure : Procedure(s):  Ablation Supraventricular Tachycardia          No past medical history on file.   No past surgical history on file.   Allergies   Allergen Reactions    Shellfish Derived [Shellfish-Derived Products] Anaphylaxis    Phenytoin Sodium Extended [Phenytoin] Other (See Comments)     Swelling and skin peeling in hands and feet      Social History     Tobacco Use    Smoking status: Former     Types: Cigarettes     Quit date: 2008     Years since quitting: 15.7    Smokeless tobacco: Never   Substance Use Topics    Alcohol use: Yes     Comment: 2 monthly      Wt Readings from Last 1 Encounters:   23 72.6 kg (160 lb)        Anesthesia Evaluation   Pt has had prior anesthetic.         ROS/MED HX  ENT/Pulmonary:  - neg pulmonary ROS     Neurologic:  - neg neurologic ROS     Cardiovascular: Comment: EKG  Sinus rhythm  Cannot rule out Inferior infarct , age undetermined  Abnormal ECG  Unconfirmed report - interpretation of this ECG is computer generated - see medical record for final interpretation  Confirmed by - EMERGENCY ROOM, PHYSICIAN ([id number]),  MOIRA SUAREZ ([id number]) on 7/3/2023 7:25:12 AM     Echo  Interpretation Summary     1.Left ventricular size, wall motion and function are normal. The ejection  fraction is 60-65%.  2.Normal right ventricle size and systolic function.  3.The aortic valve is trileaflet. There is mild (1+) aortic regurgitation.  Decel 1/2 time is 528 msec.  4.Normal size ascending aorta.  There is no comparison study available       (+)  - -   -  - -                        dysrhythmias, PVCs,          (-) murmur   METS/Exercise Tolerance: >4 METS    Hematologic:  - neg hematologic  ROS     Musculoskeletal:  - neg musculoskeletal ROS     GI/Hepatic:  - neg GI/hepatic ROS     Renal/Genitourinary:  - neg Renal ROS     Endo:  - neg endo ROS      Psychiatric/Substance Use:  - neg psychiatric ROS     Infectious Disease:  - neg infectious disease ROS     Malignancy:  - neg malignancy ROS     Other:  - neg other ROS          Physical Exam    Airway        Mallampati: II   TM distance: > 3 FB   Neck ROM: full   Mouth opening: > 3 cm    Respiratory Devices and Support         Dental  no notable dental history     (+) Minor Abnormalities - some fillings, tiny chips      Cardiovascular          Rhythm and rate: regular and normal (-) no murmur    Pulmonary   pulmonary exam normal                OUTSIDE LABS:  CBC:   Lab Results   Component Value Date    WBC 4.9 09/26/2023    WBC 4.6 09/13/2023    HGB 14.1 09/26/2023    HGB 15.0 09/13/2023    HCT 44.6 09/26/2023    HCT 46.2 09/13/2023     09/26/2023     09/13/2023     BMP:   Lab Results   Component Value Date     09/26/2023     09/13/2023    POTASSIUM 3.8 09/26/2023    POTASSIUM 4.5 09/13/2023    CHLORIDE 103 09/26/2023    CHLORIDE 100 09/13/2023    CO2 28 09/26/2023    CO2 25 09/13/2023    BUN 11.2 09/26/2023    BUN 12.4 09/13/2023    CR 0.89 09/26/2023    CR 0.86 09/13/2023     (H) 09/26/2023    GLC 91 09/13/2023     COAGS:   Lab Results   Component Value Date    PTT 25 06/30/2023    INR 0.94 06/30/2023     POC: No results found for: BGM, HCG, HCGS  HEPATIC:   Lab Results   Component Value Date    ALBUMIN 4.3 09/13/2023    PROTTOTAL 7.3 09/13/2023    ALT 50 09/13/2023    AST 37 09/13/2023    ALKPHOS 116 (H) 09/13/2023    BILITOTAL 0.3 09/13/2023     OTHER:   Lab Results   Component Value Date    A1C 5.4 01/18/2023    SASHA 9.1 09/26/2023    MAG 2.0 06/30/2023    LIPASE 101 (H) 06/30/2023    TSH 1.17 06/30/2023       Anesthesia Plan    ASA Status:  3    NPO Status:  NPO Appropriate    Anesthesia Type: MAC.   Induction: Intravenous, Propofol.   Maintenance: TIVA.        Consents    Anesthesia Plan(s) and associated risks, benefits, and realistic alternatives discussed. Questions  answered and patient/representative(s) expressed understanding.     - Discussed: Risks, Benefits and Alternatives for BOTH SEDATION and the PROCEDURE were discussed     - Discussed with:  Patient       - Patient is DNR/DNI Status: No          Postoperative Care    Pain management: IV analgesics, Oral pain medications, Multi-modal analgesia.   PONV prophylaxis: Ondansetron (or other 5HT-3), Dexamethasone or Solumedrol     Comments:    Other Comments: TIVA with Propofol  Zofran for PONV            Messi Buck MD

## 2023-11-06 PROBLEM — M15.0 PRIMARY OSTEOARTHRITIS INVOLVING MULTIPLE JOINTS: Status: ACTIVE | Noted: 2023-11-06

## 2023-11-06 PROBLEM — F32.5 MAJOR DEPRESSIVE DISORDER, SINGLE EPISODE, IN FULL REMISSION (H): Status: ACTIVE | Noted: 2023-11-06

## 2023-11-06 PROBLEM — G25.0 TREMOR, ESSENTIAL: Status: ACTIVE | Noted: 2022-06-15

## 2023-11-06 PROBLEM — I48.92 ATRIAL FLUTTER (H): Status: ACTIVE | Noted: 2023-11-06

## 2023-11-06 PROBLEM — E78.2 MIXED HYPERLIPIDEMIA: Status: ACTIVE | Noted: 2023-11-06

## 2023-11-06 PROBLEM — N64.9 BREAST LESION: Status: ACTIVE | Noted: 2023-11-06

## 2023-11-06 PROBLEM — G89.29 OTHER CHRONIC PAIN: Status: ACTIVE | Noted: 2023-11-06

## 2023-11-06 PROBLEM — M19.049 ARTHRITIS OF HAND: Status: ACTIVE | Noted: 2023-11-06

## 2023-11-06 PROBLEM — F41.1 GENERALIZED ANXIETY DISORDER: Status: ACTIVE | Noted: 2023-11-06

## 2023-11-06 PROBLEM — I47.10 SVT (SUPRAVENTRICULAR TACHYCARDIA) (H): Status: ACTIVE | Noted: 2023-11-06

## 2023-11-06 PROBLEM — F17.211 NICOTINE DEPENDENCE, CIGARETTES, IN REMISSION: Status: ACTIVE | Noted: 2023-11-06

## 2023-11-06 PROBLEM — Z98.890 S/P ABLATION OPERATION FOR ARRHYTHMIA: Status: ACTIVE | Noted: 2023-11-06

## 2023-11-06 PROBLEM — I77.810 DILATION OF THORACIC AORTA (H): Status: ACTIVE | Noted: 2023-11-06

## 2023-11-06 PROBLEM — Z86.79 S/P ABLATION OPERATION FOR ARRHYTHMIA: Status: ACTIVE | Noted: 2023-11-06

## 2023-11-06 PROBLEM — G47.00 INSOMNIA: Status: ACTIVE | Noted: 2023-11-06

## 2023-11-06 NOTE — PROGRESS NOTES
Thank you, Dr. Toussaint, for asking the [facility] team to see Ms. Damon Rock to evaluate 6 weeks post SVT ablation.    Assessment/Recommendations     Assessment/Plan:    Diagnoses and all orders for this visit:  SVT (supraventricular tachycardia)  S/P ablation operation for arrhythmia    -Ablation of AV darci re-entrant tachycardia via slow pathway ablation 9/26/23 per Dr Toussaint  -Notes no recurrence of SVT since ablation, heart rates in the 80's today.  She does typically monitor heart rates at home using her oximeter, on average they usually run in the low to mid 70s.  -No ER visits since ablation        Follow up in 6 months with Dr Vasquez. Follow up with EP in one year post ablation     History of Present Illness/Subjective     Damon Rock is a very pleasant 67 year old female who comes in today for EP follow up 6 weeks post SVT ablation    Damon Rock is a 67 year old female with supraventricular tachycardia, essential tremor         Arrhythmia hx  Dx/date: Mrs. Rock notes episodes of palpitations since and was diagnosed with SVT (reported to be atrial flutter - ECG shows short RP NCT 131bpm) 3/7/2021.  She has thereafter had approximately 4 episodes per year, though had noted 3 episodes in 6/2023.  She had an ER visit 6/30/2023 with note of short RP NCT 140bpm - this terminated spontaneously to sinus rhythm.    Sx: She denies chest pain, syncope.    Prior AAD, AV darci blocking agents: She has taken metoprolol as needed, usually with episode termination approximately 2 hours thereafter.  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  Her partner is also present during the visit today. Ablation of AV darci re-entrant tachycardia via slow pathway ablation 9/26/23 per Dr Toussaint.  She remains off beta-blocker and  calcium channel blocker medications post ablation.  She denies any complications with groin site access since her ablation.  She continues to monitor her heart rates at home using her oximeter, on average her heart rates typically run in the low to mid 70s.  Blood pressure remains normotensive, 100/71.  She denies any recent chest pain, palpitations, dizziness or lightheadedness, shortness of breath on exertion, presyncope or syncopal episodes.  She remains well-hydrated, she does not consume caffeine as this tends to exacerbate her tremors.  She will occasionally have a glass of wine but this is not on a regular basis.  She is a former smoker.  She does use medical marijuana for managing her chronic arthritic pain which has been optimal for her.     Cardiographics (reviewed):    ECGs (tracings independently reviewed)    9/26/23 sinus bradycardia with PACs rate 58 bpm QT/QTC: 474/465 ms    6/30/2023 - SR 64bpm.  No evidence of ventricular pre-excitation, normal QTc, no epsilon waves, no Brugada pattern.  6/30/2023 - short RP NCT 119bpm  6/30/2023 - short RP NCT 142bpm    3/10/2021 - SR 57bpm  3/7/2021 - short RP NCT 131bpm       7/26/2023 TTE  1.Left ventricular size, wall motion and function are normal. The ejection  fraction is 60-65%.  2.Normal right ventricle size and systolic function.  3.The aortic valve is trileaflet. There is mild (1+) aortic regurgitation.  Decel 1/2 time is 528 msec.  4.Normal size ascending aorta.  There is no comparison study available       Problem List:  Patient Active Problem List   Diagnosis    Paroxysmal atrial flutter (H)    AVNRT (AV darci re-entry tachycardia)    Arthritis of hand    Breast lesion    Dilation of thoracic aorta (H24)    Generalized anxiety disorder    Insomnia    Major depressive disorder, single episode, in full remission (H24)    Mixed hyperlipidemia    Nicotine dependence, cigarettes, in remission    Other chronic pain    Primary osteoarthritis involving  multiple joints    SVT (supraventricular tachycardia)    Tremor, essential    Atrial flutter (H)    S/P ablation operation for arrhythmia     Revi  e  Physical Examination Review of Systems   w fystems  There were no vitals taken for this visit.  There is no height or weight on file to calculate BMI.  Wt Readings from Last 3 Encounters:   09/26/23 72.6 kg (160 lb)   07/26/23 72.1 kg (159 lb)   06/30/23 71.2 kg (157 lb)     General Appearance:   Alert, well-appearing and in no acute distress.   HEENT: Atraumatic, normocephalic.  No scleral icterus, normal conjunctivae; mucous membranes pink and moist.     Chest: Chest symmetric, spine straight.   Lungs:   Respirations unlabored: Lungs are clear to auscultation.   Cardiovascular:   Normal first and second heart sounds with no murmurs, rubs, or gallops.  Regular, regular.   Normal JVD, no edema.       Extremities: No cyanosis or clubbing   Musculoskeletal: Moves all extremities   Skin: Warm, dry, intact.    Neurologic: Mood and affect are appropriate, alert and oriented to person, place, time, and situation     ROS: 10 point ROS neg other than the symptoms noted above in the HPI.     Medical History  Surgical History Family History Social History     No past medical history on file. Past Surgical History:   Procedure Laterality Date    EP ABLATION SVT N/A 9/26/2023    Procedure: Ablation Supraventricular Tachycardia;  Surgeon: Flavia Toussaint MD;  Location: [facility]    Family History   Problem Relation Age of Onset    Heart Failure Mother 70.00    History   Smoking Status    Former    Types: Cigarettes    Quit date: 1/1/2008   Smokeless Tobacco    Never     Social History    Substance and Sexual Activity      Alcohol use: Yes        Comment: 2 monthly       Medications  Allergies     Current Outpatient Medications   Medication Sig Dispense Refill    ALPRAZolam (XANAX) 1 MG tablet [ALPRAZOLAM (XANAX) 1 MG TABLET] As needed      atorvastatin (LIPITOR)  "20 MG tablet [ATORVASTATIN (LIPITOR) 20 MG TABLET] Take 20 mg by mouth daily.       busPIRone (BUSPAR) 10 MG tablet [BUSPIRONE (BUSPAR) 10 MG TABLET] TAKE 1 TABLET BY MOUTH TWICE DAILY FOR ANXIETY      FLUoxetine (PROZAC) 20 MG capsule Take 20 mg by mouth daily      gabapentin (NEURONTIN) 100 MG capsule Take 100 mg by mouth 3 times daily        Allergies   Allergen Reactions    Shellfish Derived [Shellfish-Derived Products] Anaphylaxis    Phenytoin Sodium Extended [Phenytoin] Other (See Comments)     Swelling and skin peeling in hands and feet      Medical, surgical, family, social history, and medications were all reviewed and updated as necessary.   Lab Results    Chemistry/lipid CBC Cardiac Enzymes/BNP/TSH/INR   Recent Labs   Lab Test 09/13/23  1334   CHOL 198   HDL 67   *   TRIG 148     Recent Labs   Lab Test 09/13/23  1334 07/18/22  1533 04/01/21  1356   * 80 87     Recent Labs   Lab Test 09/26/23  0902      POTASSIUM 3.8   CHLORIDE 103   CO2 28   *   BUN 11.2   CR 0.89   GFRESTIMATED 71   SASHA 9.1     Recent Labs   Lab Test 09/26/23  0902 09/13/23  1334 06/30/23  0913   CR 0.89 0.86 0.80     Recent Labs   Lab Test 01/18/23  0941   A1C 5.4          Recent Labs   Lab Test 09/26/23  0902   WBC 4.9   HGB 14.1   HCT 44.6           Recent Labs   Lab Test 09/26/23  0902 09/13/23  1334 06/30/23  0913   HGB 14.1 15.0 15.4    No results for input(s): \"TROPONINI\" in the last 16151 hours.  Recent Labs   Lab Test 06/30/23  0913   NTBNPI 165     Recent Labs   Lab Test 06/30/23  0913   TSH 1.17     Recent Labs   Lab Test 06/30/23  0913   INR 0.94          Total Time- 29 minutes spent on date of encounter doing chart review, history and exam, documentation and further activities as noted above.  This note has been dictated using voice recognition software. Any grammatical, typographical, or context distortions are unintentional and inherent to the software.    Ofelia Kingston CNP  M " [facility]  [phone]

## 2023-11-07 ENCOUNTER — OFFICE VISIT (OUTPATIENT)
Dept: CARDIOLOGY | Facility: CLINIC | Age: 68
End: 2023-11-07
Payer: MEDICARE

## 2023-11-07 VITALS
SYSTOLIC BLOOD PRESSURE: 100 MMHG | HEART RATE: 81 BPM | RESPIRATION RATE: 16 BRPM | BODY MASS INDEX: 29.45 KG/M2 | WEIGHT: 161 LBS | DIASTOLIC BLOOD PRESSURE: 71 MMHG

## 2023-11-07 DIAGNOSIS — I47.10 SVT (SUPRAVENTRICULAR TACHYCARDIA) (H): Primary | ICD-10-CM

## 2023-11-07 DIAGNOSIS — Z86.79 S/P ABLATION OPERATION FOR ARRHYTHMIA: ICD-10-CM

## 2023-11-07 DIAGNOSIS — I47.19 AVNRT (AV NODAL RE-ENTRY TACHYCARDIA) (H): ICD-10-CM

## 2023-11-07 DIAGNOSIS — Z98.890 S/P ABLATION OPERATION FOR ARRHYTHMIA: ICD-10-CM

## 2023-11-07 PROCEDURE — 99213 OFFICE O/P EST LOW 20 MIN: CPT | Performed by: NURSE PRACTITIONER

## 2023-11-07 RX ORDER — CHOLECALCIFEROL (VITAMIN D3) 50 MCG
1 TABLET ORAL DAILY
COMMUNITY
Start: 2023-09-15

## 2023-11-07 NOTE — LETTER
11/7/2023    Ari Jesus MD  [address]  [place] [address]    RE: Damon Rock       Dear Colleague,     I had the pleasure of seeing Damon Rock in the [facility].    Thank you, Dr. Toussaint, for asking the [facility] team to see Ms. Damon Rock to evaluate 6 weeks post SVT ablation.    Assessment/Recommendations     Assessment/Plan:    Diagnoses and all orders for this visit:  SVT (supraventricular tachycardia)  S/P ablation operation for arrhythmia    -Ablation of AV darci re-entrant tachycardia via slow pathway ablation 9/26/23 per Dr Toussaint  -Notes no recurrence of SVT since ablation, heart rates in the 80's today.  She does typically monitor heart rates at home using her oximeter, on average they usually run in the low to mid 70s.  -No ER visits since ablation        Follow up in 6 months with Dr Vasquez. Follow up with EP in one year post ablation     History of Present Illness/Subjective     Damon Rock is a very pleasant 67 year old female who comes in today for EP follow up 6 weeks post SVT ablation    Damon Rock is a 67 year old female with supraventricular tachycardia, essential tremor         Arrhythmia hx  Dx/date: Mrs. Rock notes episodes of palpitations since and was diagnosed with SVT (reported to be atrial flutter - ECG shows short RP NCT 131bpm) 3/7/2021.  She has thereafter had approximately 4 episodes per year, though had noted 3 episodes in 6/2023.  She had an ER visit 6/30/2023 with note of short RP NCT 140bpm - this terminated spontaneously to sinus rhythm.    Sx: She denies chest pain, syncope.    Prior AAD, AV darci blocking agents: She has taken metoprolol as needed, usually with episode termination approximately 2 hours thereafter.  Procedures  DCCV: no  Ablation: Ablation of AV darci re-entrant tachycardia via slow pathway ablation 9/26/23 per Dr Toussaint      Patient  presents for his 6-week post SVT ablation follow-up.  Her partner is also present during the visit today. Ablation of AV darci re-entrant tachycardia via slow pathway ablation 9/26/23 per Dr Toussaint.  She remains off beta-blocker and calcium channel blocker medications post ablation.  She denies any complications with groin site access since her ablation.  She continues to monitor her heart rates at home using her oximeter, on average her heart rates typically run in the low to mid 70s.  Blood pressure remains normotensive, 100/71.  She denies any recent chest pain, palpitations, dizziness or lightheadedness, shortness of breath on exertion, presyncope or syncopal episodes.  She remains well-hydrated, she does not consume caffeine as this tends to exacerbate her tremors.  She will occasionally have a glass of wine but this is not on a regular basis.  She is a former smoker.  She does use medical marijuana for managing her chronic arthritic pain which has been optimal for her.     Cardiographics (reviewed):    ECGs (tracings independently reviewed)    9/26/23 sinus bradycardia with PACs rate 58 bpm QT/QTC: 474/465 ms    6/30/2023 - SR 64bpm.  No evidence of ventricular pre-excitation, normal QTc, no epsilon waves, no Brugada pattern.  6/30/2023 - short RP NCT 119bpm  6/30/2023 - short RP NCT 142bpm    3/10/2021 - SR 57bpm  3/7/2021 - short RP NCT 131bpm       7/26/2023 TTE  1.Left ventricular size, wall motion and function are normal. The ejection  fraction is 60-65%.  2.Normal right ventricle size and systolic function.  3.The aortic valve is trileaflet. There is mild (1+) aortic regurgitation.  Decel 1/2 time is 528 msec.  4.Normal size ascending aorta.  There is no comparison study available       Problem List:  Patient Active Problem List   Diagnosis    Paroxysmal atrial flutter (H)    AVNRT (AV darci re-entry tachycardia)    Arthritis of hand    Breast lesion    Dilation of thoracic aorta (H24)    Generalized  anxiety disorder    Insomnia    Major depressive disorder, single episode, in full remission (H24)    Mixed hyperlipidemia    Nicotine dependence, cigarettes, in remission    Other chronic pain    Primary osteoarthritis involving multiple joints    SVT (supraventricular tachycardia)    Tremor, essential    Atrial flutter (H)    S/P ablation operation for arrhythmia     Revi  e  Physical Examination Review of Systems   w fystems  There were no vitals taken for this visit.  There is no height or weight on file to calculate BMI.  Wt Readings from Last 3 Encounters:   09/26/23 72.6 kg (160 lb)   07/26/23 72.1 kg (159 lb)   06/30/23 71.2 kg (157 lb)     General Appearance:   Alert, well-appearing and in no acute distress.   HEENT: Atraumatic, normocephalic.  No scleral icterus, normal conjunctivae; mucous membranes pink and moist.     Chest: Chest symmetric, spine straight.   Lungs:   Respirations unlabored: Lungs are clear to auscultation.   Cardiovascular:   Normal first and second heart sounds with no murmurs, rubs, or gallops.  Regular, regular.   Normal JVD, no edema.       Extremities: No cyanosis or clubbing   Musculoskeletal: Moves all extremities   Skin: Warm, dry, intact.    Neurologic: Mood and affect are appropriate, alert and oriented to person, place, time, and situation     ROS: 10 point ROS neg other than the symptoms noted above in the HPI.     Medical History  Surgical History Family History Social History     No past medical history on file. Past Surgical History:   Procedure Laterality Date    EP ABLATION SVT N/A 9/26/2023    Procedure: Ablation Supraventricular Tachycardia;  Surgeon: Flavia Toussaint MD;  Location: [facility]    Family History   Problem Relation Age of Onset    Heart Failure Mother 70.00    History   Smoking Status    Former    Types: Cigarettes    Quit date: 1/1/2008   Smokeless Tobacco    Never     Social History    Substance and Sexual Activity      Alcohol use:  "Yes        Comment: 2 monthly       Medications  Allergies     Current Outpatient Medications   Medication Sig Dispense Refill    ALPRAZolam (XANAX) 1 MG tablet [ALPRAZOLAM (XANAX) 1 MG TABLET] As needed      atorvastatin (LIPITOR) 20 MG tablet [ATORVASTATIN (LIPITOR) 20 MG TABLET] Take 20 mg by mouth daily.       busPIRone (BUSPAR) 10 MG tablet [BUSPIRONE (BUSPAR) 10 MG TABLET] TAKE 1 TABLET BY MOUTH TWICE DAILY FOR ANXIETY      FLUoxetine (PROZAC) 20 MG capsule Take 20 mg by mouth daily      gabapentin (NEURONTIN) 100 MG capsule Take 100 mg by mouth 3 times daily        Allergies   Allergen Reactions    Shellfish Derived [Shellfish-Derived Products] Anaphylaxis    Phenytoin Sodium Extended [Phenytoin] Other (See Comments)     Swelling and skin peeling in hands and feet      Medical, surgical, family, social history, and medications were all reviewed and updated as necessary.   Lab Results    Chemistry/lipid CBC Cardiac Enzymes/BNP/TSH/INR   Recent Labs   Lab Test 09/13/23  1334   CHOL 198   HDL 67   *   TRIG 148     Recent Labs   Lab Test 09/13/23  1334 07/18/22  1533 04/01/21  1356   * 80 87     Recent Labs   Lab Test 09/26/23  0902      POTASSIUM 3.8   CHLORIDE 103   CO2 28   *   BUN 11.2   CR 0.89   GFRESTIMATED 71   SASHA 9.1     Recent Labs   Lab Test 09/26/23  0902 09/13/23  1334 06/30/23  0913   CR 0.89 0.86 0.80     Recent Labs   Lab Test 01/18/23  0941   A1C 5.4          Recent Labs   Lab Test 09/26/23  0902   WBC 4.9   HGB 14.1   HCT 44.6           Recent Labs   Lab Test 09/26/23  0902 09/13/23  1334 06/30/23  0913   HGB 14.1 15.0 15.4    No results for input(s): \"TROPONINI\" in the last 46392 hours.  Recent Labs   Lab Test 06/30/23  0913   NTBNPI 165     Recent Labs   Lab Test 06/30/23  0913   TSH 1.17     Recent Labs   Lab Test 06/30/23  0913   INR 0.94          Total Time- 29 minutes spent on date of encounter doing chart review, history and exam, documentation " and further activities as noted above.  This note has been dictated using voice recognition software. Any grammatical, typographical, or context distortions are unintentional and inherent to the software.    Ofelia Kingston CNP  [facility]  [phone]      Thank you for allowing me to participate in the care of your patient.      Sincerely,     Ofelia Kingston NP     [facility]  cc:   Flavia Toussaint MD  [address]  [place] [address]

## 2023-11-07 NOTE — PATIENT INSTRUCTIONS
Damon Rock,    It was a pleasure to see you today at the [facility].     My recommendations after this visit include:    Continue with current medications  Follow up with Dr Vasquez in 6 months, you will need to call to schedule this appointment in 3 months, call the [phone] listed below.   Follow up with EP clinic again in one year for your SVT ablation follow up      Ofelia Kingston CNP  [facility], Electrophysiology  [phone]  EP nurses [phone]

## 2024-01-25 NOTE — PROGRESS NOTES
Department of Neurology  Movement Disorders Division     Patient: Damon Rock   MRN: [id number]   : 1955   Date of Visit: 24     Impression:  Damon Rock is a 68 year old female with Essential Tremor. The patient's main concern today is tremor. We discussed medications (increasing gabapentin, primidone), neurotoxin injections to improve dystonic tremor of the head, wearable devices (Abram kIQ), and surgeries (Deep Brain Stimulation, radiofrequency ablation/gamma knife and focused ultrasound). Regarding surgeries, here at the [facility], we do gamma knife surgery. The [facility] does the focused ultra sound procedure. She opted to trial primidone. After discussions with our Neurology pharmacist, Dr. Dahiana Claros, PharmD, she did not think primidone would have a cross reactivity in setting of reaction to Dilantin. Will start primidone at a low dose and increase cautiously.     Plan:   - Wean off gabapentin 100 mg by stopping 1 tab every 5 days  Gabapentin 100 mg                 AM Noon PM      Day 1-5 1 1 stop      Day 6-10 1 stop       [place] Stop         - Start primidone 50 mg by taking 0.5 tab x 2 week, then increase to a full tab.   - Observe for symptoms, call for questions.   - Places to find out more information about essential tremor and treatments are:  [email or web address]  And  You can go to [email or web address] and look for patient information about tremor under the guidelines section.  - Call the clinic for questions or concerns.    - Deep Brain Stimulation resources provided     Patient to return in 3-4 months, for in-person visit, 30 minutes.     Ofelia Batista DO, MA   of Neurology   [facility]    Note dictated using voice recognition software.   34 minutes spent on date of encounter doing chart reviews and exam and documentation and further activities as noted above.         ------------------------------------------------------------------------------------------------------------      History of Present Illness  Ms. Rock is a pleasant 68 year old female that presents to Neurology Movement clinic for follow up regarding Essential Tremor management.   Patient was last seen on 6/30/23 where  At this visit, Patient reported feeling unwell during her visit and reported a fast heart rate. She took her prescribed metoprolol intended for these situations. ECG revealed possible SVT. She was sent to the ER for further management.  She was found to have SVT. She follows with Dr. Kingston, Cardiology, and was seen on 11/2023 for follow up regarding management of SVT status post ablation on 9/26/23 by Dr. Toussaint with reported resolution of arrhythmia. Regarding her tremor, I decided against taking primidone based on my conversation with pharmacy. Instead, I started her on gabapentin, low dose and titrate up. Upon rediscussion with Pharmacy, there should not be any cross reactivity between phenytoin and primidone (in setting of previous reaction on Dilantin) so we could consider this in the future.     History obtained from patient. Patient is accompanied by Kaylen.  Main complaint: tremor  Tremor: Patient reports gabapentin are not working well to treat tremor. Kaylen cuts Damon's food.  Denies side effect to gabapentin.   Heart issues: resolved     Movement Disorder-related Medications                   Indication        Gabapentin 100 mg ET 1 1 1                                  Review of Systems:  Other than that mentioned above, the remainder of 12 systems reviewed were negative.    PMH: unchanged  PSH: unchanged  FH: unchanged  SH: unchanged    Medications:  Current Outpatient Medications   Medication Sig Dispense Refill    ALPRAZolam (XANAX) 1 MG tablet [ALPRAZOLAM (XANAX) 1 MG TABLET] As needed      atorvastatin (LIPITOR) 20 MG tablet [ATORVASTATIN (LIPITOR) 20 MG TABLET] Take 20  mg by mouth daily.       busPIRone (BUSPAR) 10 MG tablet [BUSPIRONE (BUSPAR) 10 MG TABLET] TAKE 1 TABLET BY MOUTH TWICE DAILY FOR ANXIETY      FLUoxetine (PROZAC) 20 MG capsule Take 20 mg by mouth daily      gabapentin (NEURONTIN) 100 MG capsule Take 100 mg by mouth 3 times daily      vitamin D3 (CHOLECALCIFEROL) 50 mcg (2000 units) tablet Take 1 tablet by mouth daily             Allergies   Allergen Reactions    Shellfish Derived [Shellfish-Derived Products] Anaphylaxis    Phenytoin Sodium Extended [Phenytoin] Other (See Comments)     Swelling and skin peeling in hands and feet          Physical Exam:  The patient's  blood pressure is 98/68 and her pulse is 75.        1/26/2024    10:00 AM   Tremor Motor Scale   Medication On   DBS - Right Brain None   DBS - Left Brain None   Head 1.5   Face & Jaw 0   Voice 0   Outstretched - RIGHT 1.5   Outstretched - LEFT 1   Wingbeating - RIGHT 1.5   Wingbeating - LEFT 0.5   Kinetic - RIGHT 1.5   Kinetic - LEFT 0.5   Lower Limb - RIGHT 0   Lower Limb - LEFT 0   Lower Limb (Max) 0   Trunk (Standing) 0   Axial 1.5       Lab Results   Component Value Date    A1C 5.4 01/18/2023     TSH   Date Value Ref Range Status   06/30/2023 1.17 0.30 - 4.20 uIU/mL Final   03/10/2021 1.24 0.30 - 5.00 uIU/mL Final     CBC RESULTS:   Recent Labs   Lab Test 09/26/23  0902   WBC 4.9   RBC 4.46   HGB 14.1   HCT 44.6      MCH 31.6   MCHC 31.6   RDW 12.6        Last Comprehensive Metabolic Panel:  Sodium   Date Value Ref Range Status   09/26/2023 139 136 - 145 mmol/L Final     Potassium   Date Value Ref Range Status   09/26/2023 3.8 3.4 - 5.3 mmol/L Final   01/18/2023 4.3 3.5 - 5.0 mmol/L Final     Chloride   Date Value Ref Range Status   09/26/2023 103 98 - 107 mmol/L Final   01/18/2023 103 98 - 107 mmol/L Final     Carbon Dioxide (CO2)   Date Value Ref Range Status   09/26/2023 28 22 - 29 mmol/L Final   01/18/2023 27 22 - 31 mmol/L Final     Anion Gap   Date Value Ref Range Status    09/26/2023 8 7 - 15 mmol/L Final   01/18/2023 9 5 - 18 mmol/L Final     Glucose   Date Value Ref Range Status   09/26/2023 104 (H) 70 - 99 mg/dL Final   01/18/2023 88 70 - 125 mg/dL Final     Urea Nitrogen   Date Value Ref Range Status   09/26/2023 11.2 8.0 - 23.0 mg/dL Final   01/18/2023 14 8 - 22 mg/dL Final     Creatinine   Date Value Ref Range Status   09/26/2023 0.89 0.51 - 0.95 mg/dL Final     GFR Estimate   Date Value Ref Range Status   09/26/2023 71 >60 mL/min/1.73m2 Final   04/01/2021 >60 >60 mL/min/1.73m2 Final     Calcium   Date Value Ref Range Status   09/26/2023 9.1 8.8 - 10.2 mg/dL Final     Bilirubin Total   Date Value Ref Range Status   09/13/2023 0.3 <=1.2 mg/dL Final     Alkaline Phosphatase   Date Value Ref Range Status   09/13/2023 116 (H) 35 - 104 U/L Final     ALT   Date Value Ref Range Status   09/13/2023 50 0 - 50 U/L Final     Comment:     Reference intervals for this test were updated on 6/12/2023 to more accurately reflect our healthy population. There may be differences in the flagging of prior results with similar values performed with this method. Interpretation of those prior results can be made in the context of the updated reference intervals.       AST   Date Value Ref Range Status   09/13/2023 37 0 - 45 U/L Final     Comment:     Reference intervals for this test were updated on 6/12/2023 to more accurately reflect our healthy population. There may be differences in the flagging of prior results with similar values performed with this method. Interpretation of those prior results can be made in the context of the updated reference intervals.

## 2024-01-26 ENCOUNTER — OFFICE VISIT (OUTPATIENT)
Dept: NEUROLOGY | Facility: CLINIC | Age: 69
End: 2024-01-26
Payer: MEDICARE

## 2024-01-26 VITALS — DIASTOLIC BLOOD PRESSURE: 68 MMHG | HEART RATE: 75 BPM | SYSTOLIC BLOOD PRESSURE: 98 MMHG

## 2024-01-26 DIAGNOSIS — G25.0 ESSENTIAL TREMOR: Primary | ICD-10-CM

## 2024-01-26 PROCEDURE — 99214 OFFICE O/P EST MOD 30 MIN: CPT | Performed by: PSYCHIATRY & NEUROLOGY

## 2024-01-26 RX ORDER — PRIMIDONE 50 MG/1
TABLET ORAL
Qty: 30 TABLET | Refills: 11 | Status: SHIPPED | OUTPATIENT
Start: 2024-02-19 | End: 2024-08-21

## 2024-01-26 NOTE — LETTER
2024         RE: Damon Rock  [address] [address]        Dear Colleague,    Thank you for referring your patient, Damon Rock, to the [facility]. Please see a copy of my visit note below.    Department of Neurology  Movement Disorders Division     Patient: Damon Rock   MRN: [id number]   : 1955   Date of Visit: 24     Impression:  Damon Rock is a 68 year old female with Essential Tremor. The patient's main concern today is tremor. We discussed medications (increasing gabapentin, primidone), neurotoxin injections to improve dystonic tremor of the head, wearable devices (Abram kIQ), and surgeries (Deep Brain Stimulation, radiofrequency ablation/gamma knife and focused ultrasound). Regarding surgeries, here at the [facility], we do gamma knife surgery. The [facility] does the focused ultra sound procedure. She opted to trial primidone. After discussions with our Neurology pharmacist, Dr. Dahiana Claros, PharmD, she did not think primidone would have a cross reactivity in setting of reaction to Dilantin. Will start primidone at a low dose and increase cautiously.     Plan:   - Wean off gabapentin 100 mg by stopping 1 tab every 5 days  Gabapentin 100 mg                 AM Noon PM      Day 1-5 1 1 stop      Day 6-10 1 stop       [place] Stop         - Start primidone 50 mg by taking 0.5 tab x 2 week, then increase to a full tab.   - Observe for symptoms, call for questions.   - Places to find out more information about essential tremor and treatments are:  Essentialtremor.org  And  You can go to [email or web address][email or web address] and look for patient information about tremor under the guidelines section.  - Call the clinic for questions or concerns.    - Deep Brain Stimulation resources provided     Patient to return in 3-4 months, for in-person visit, 30 minutes.     Ofelia Batista DO, MA   of Neurology    [facility]    Note dictated using voice recognition software.   34 minutes spent on date of encounter doing chart reviews and exam and documentation and further activities as noted above.        ------------------------------------------------------------------------------------------------------------      History of Present Illness  Ms. Rock is a pleasant 68 year old female that presents to Neurology Movement clinic for follow up regarding Essential Tremor management.   Patient was last seen on 6/30/23 where  At this visit, Patient reported feeling unwell during her visit and reported a fast heart rate. She took her prescribed metoprolol intended for these situations. ECG revealed possible SVT. She was sent to the ER for further management.  She was found to have SVT. She follows with Dr. Kingston, Cardiology, and was seen on 11/2023 for follow up regarding management of SVT status post ablation on 9/26/23 by Dr. Toussaint with reported resolution of arrhythmia. Regarding her tremor, I decided against taking primidone based on my conversation with pharmacy. Instead, I started her on gabapentin, low dose and titrate up. Upon rediscussion with Pharmacy, there should not be any cross reactivity between phenytoin and primidone (in setting of previous reaction on Dilantin) so we could consider this in the future.     History obtained from patient. Patient is accompanied by Kaylen.  Main complaint: tremor  Tremor: Patient reports gabapentin are not working well to treat tremor. Kaylen cuts Damon's food.  Denies side effect to gabapentin.   Heart issues: resolved     Movement Disorder-related Medications                   Indication        Gabapentin 100 mg ET 1 1 1                                  Review of Systems:  Other than that mentioned above, the remainder of 12 systems reviewed were negative.    PMH: unchanged  PSH: unchanged  FH: unchanged  SH: unchanged    Medications:  Current Outpatient  Medications   Medication Sig Dispense Refill     ALPRAZolam (XANAX) 1 MG tablet [ALPRAZOLAM (XANAX) 1 MG TABLET] As needed       atorvastatin (LIPITOR) 20 MG tablet [ATORVASTATIN (LIPITOR) 20 MG TABLET] Take 20 mg by mouth daily.        busPIRone (BUSPAR) 10 MG tablet [BUSPIRONE (BUSPAR) 10 MG TABLET] TAKE 1 TABLET BY MOUTH TWICE DAILY FOR ANXIETY       FLUoxetine (PROZAC) 20 MG capsule Take 20 mg by mouth daily       gabapentin (NEURONTIN) 100 MG capsule Take 100 mg by mouth 3 times daily       vitamin D3 (CHOLECALCIFEROL) 50 mcg (2000 units) tablet Take 1 tablet by mouth daily             Allergies   Allergen Reactions     Shellfish Derived [Shellfish-Derived Products] Anaphylaxis     Phenytoin Sodium Extended [Phenytoin] Other (See Comments)     Swelling and skin peeling in hands and feet          Physical Exam:  The patient's  blood pressure is 98/68 and her pulse is 75.        1/26/2024    10:00 AM   Tremor Motor Scale   Medication On   DBS - Right Brain None   DBS - Left Brain None   Head 1.5   Face & Jaw 0   Voice 0   Outstretched - RIGHT 1.5   Outstretched - LEFT 1   Wingbeating - RIGHT 1.5   Wingbeating - LEFT 0.5   Kinetic - RIGHT 1.5   Kinetic - LEFT 0.5   Lower Limb - RIGHT 0   Lower Limb - LEFT 0   Lower Limb (Max) 0   Trunk (Standing) 0   Axial 1.5       Lab Results   Component Value Date    A1C 5.4 01/18/2023     TSH   Date Value Ref Range Status   06/30/2023 1.17 0.30 - 4.20 uIU/mL Final   03/10/2021 1.24 0.30 - 5.00 uIU/mL Final     CBC RESULTS:   Recent Labs   Lab Test 09/26/23  0902   WBC 4.9   RBC 4.46   HGB 14.1   HCT 44.6      MCH 31.6   MCHC 31.6   RDW 12.6        Last Comprehensive Metabolic Panel:  Sodium   Date Value Ref Range Status   09/26/2023 139 136 - 145 mmol/L Final     Potassium   Date Value Ref Range Status   09/26/2023 3.8 3.4 - 5.3 mmol/L Final   01/18/2023 4.3 3.5 - 5.0 mmol/L Final     Chloride   Date Value Ref Range Status   09/26/2023 103 98 - 107 mmol/L Final    01/18/2023 103 98 - 107 mmol/L Final     Carbon Dioxide (CO2)   Date Value Ref Range Status   09/26/2023 28 22 - 29 mmol/L Final   01/18/2023 27 22 - 31 mmol/L Final     Anion Gap   Date Value Ref Range Status   09/26/2023 8 7 - 15 mmol/L Final   01/18/2023 9 5 - 18 mmol/L Final     Glucose   Date Value Ref Range Status   09/26/2023 104 (H) 70 - 99 mg/dL Final   01/18/2023 88 70 - 125 mg/dL Final     Urea Nitrogen   Date Value Ref Range Status   09/26/2023 11.2 8.0 - 23.0 mg/dL Final   01/18/2023 14 8 - 22 mg/dL Final     Creatinine   Date Value Ref Range Status   09/26/2023 0.89 0.51 - 0.95 mg/dL Final     GFR Estimate   Date Value Ref Range Status   09/26/2023 71 >60 mL/min/1.73m2 Final   04/01/2021 >60 >60 mL/min/1.73m2 Final     Calcium   Date Value Ref Range Status   09/26/2023 9.1 8.8 - 10.2 mg/dL Final     Bilirubin Total   Date Value Ref Range Status   09/13/2023 0.3 <=1.2 mg/dL Final     Alkaline Phosphatase   Date Value Ref Range Status   09/13/2023 116 (H) 35 - 104 U/L Final     ALT   Date Value Ref Range Status   09/13/2023 50 0 - 50 U/L Final     Comment:     Reference intervals for this test were updated on 6/12/2023 to more accurately reflect our healthy population. There may be differences in the flagging of prior results with similar values performed with this method. Interpretation of those prior results can be made in the context of the updated reference intervals.       AST   Date Value Ref Range Status   09/13/2023 37 0 - 45 U/L Final     Comment:     Reference intervals for this test were updated on 6/12/2023 to more accurately reflect our healthy population. There may be differences in the flagging of prior results with similar values performed with this method. Interpretation of those prior results can be made in the context of the updated reference intervals.                        Again, thank you for allowing me to participate in the care of your patient.         Sincerely,        Ofelia Batista, DO

## 2024-01-26 NOTE — NURSING NOTE
Chief Complaint   Patient presents with    Follow Up       LANI Bustamante on 1/26/2024 at 10:01 AM

## 2024-01-26 NOTE — PATIENT INSTRUCTIONS
Plan:   - Ween off gabapentin 100 mg by stopping 1 tab every 5 days  Gabapentin 100 mg                 AM Noon PM      Day 1-5 1 1 stop      Day 6-10 1 stop       [place] Stop         - Start primidone 50 mg by taking 0.5 tab x 2 week, then increase to a full tab.   - Observe for symptoms, call for questions.   - Places to find out more information about essential tremor and treatments are:  [email or web address].org  And  You can go to AAN.com and look for patient information about tremor under the guidelines section.  - Call the clinic for questions or concerns.    - Deep Brain Stimulation resources provided   [facility] DBS Handbook    Try copying and pasting this url if the link above does not work:   [email or web address]    Visit [email or web address] to view our DBS playlist      Patient to return in 3-4 months, for in-person visit, 30 minutes.

## 2024-03-02 ENCOUNTER — HEALTH MAINTENANCE LETTER (OUTPATIENT)
Age: 69
End: 2024-03-02

## 2024-05-08 ENCOUNTER — OFFICE VISIT (OUTPATIENT)
Dept: NEUROLOGY | Facility: CLINIC | Age: 69
End: 2024-05-08
Payer: MEDICARE

## 2024-05-08 VITALS — HEART RATE: 77 BPM | SYSTOLIC BLOOD PRESSURE: 104 MMHG | DIASTOLIC BLOOD PRESSURE: 67 MMHG

## 2024-05-08 DIAGNOSIS — F43.9 STRESS: ICD-10-CM

## 2024-05-08 DIAGNOSIS — G25.0 ESSENTIAL TREMOR: Primary | ICD-10-CM

## 2024-05-08 PROCEDURE — 99214 OFFICE O/P EST MOD 30 MIN: CPT | Performed by: PSYCHIATRY & NEUROLOGY

## 2024-05-08 NOTE — PROGRESS NOTES
"Department of Neurology  Movement Disorders Division     Patient: Damon Rock   MRN: [id number]   : 1955   Date of Visit: 2024    Impression:  Damon Rock is a 68 year old female with Essential Tremor. The patient's main concern today is tremor, however, she shares that her tremors are well controlled on current dose of primidone. She would like to continue on her current dose of primidone. We discussed several different factors that exacerbate tremor including uncontrolled emotional situations, poorly controlled mood issues (stress, anxiety, depression), poor sleep, metabolic/electrolyte abnormalities, medication side effects, poorly controlled pain, injury, acute infection, etc. Meditation resources provided.     Essential Tremor: No improvement with gabapentin 100 mg 1 tab three times daily. Started primidone 2024 and reported tremor control and has not reported AEs to this medication. We have discussed the Abram kIQ, FUS, gamma knife and Deep Brain Stimulation in the past. Anxiety and stress are triggers.    Plan:   - No changes to primidone 50 mg 1 tab daily   - Discussed neurotoxin injections if head tremors become bothersome. She does not think she is to this point.   - Consider trying guided meditation. Here are some apps I recommend:.     - Zuvvu nahum (www.headspace.com): 14 day free trial and then there is a monthly or annual rate  - Calm nahum (www.calm.com): 7 day free trial and then there is an annual rate  - InsightTimer nahum (www.insighttimer.com): FREE   - Knowing how to relax is not something our society teaches. In fact, it teaches us to do the opposite. The relaxation response was developed by the [place]  and physician Dr. Jalen Kincaid. You can google both \"Jalen Kincaid\" and \"Relaxation Response\" to find a multitude of information. Breath work is the center of this process and can reduce anxiety and stress markedly.     Dr. Kincaid talks about the " "need for four things:     1. A quiet environment   2. A comfortable position    3. A passive mental attitude   4. A mental device    The passive mental attitude means that when distracting thoughts come into your mind while deep breathing, you do not  them. Instead of saying to yourself, \"Darn, I just can't calm my mind,\" say, \"Oh, that was interesting,\" and then refocus on the breath. Our brains are a complex network of connections that were meant to fire. It takes practice, lots of practice to focus and breathe. Try not to  your thoughts as good or bad, they just are.     The mental device can be just about anything. It can be meditation, prayer, saying one sound out loud, whatever you want it to be. The key is to breathe from your belly. Place your hand on your belly. As you inhale to the count of 3 through your nose, feel your hand rising. This helps to remind you to get the breath all the way down into the belly. Exhale to the count of 6 (or double whatever the count of the inhale) through pursed lips. By keeping your lips pursed, the exhale lasts longer and decreases the loss of carbon dioxide. When we breathe fast from our chest, we lose too much carbon dioxide which changes the chemistry of the body. This is what causes many of the physical symptoms of anxiety (tremor, lightheadedness, fast heart rate, ringing in the ear, feeling of unreality).     So, inhale through your nose, think 'All is well,' Exhale through pursed lips, thinking 'This and every day'. Do this twice a day, first thing in the morning and before dinner in the evening. Do it for a minimum of 10 minutes, a maximum of 20 minutes (Dr. Jalen Kincaid's Relaxation Response). These two phrases are just suggestions. Change it to whatever resonates with you.     Remember, practice this daily, so that you will have this skill when you need it most. Please watch this short 2 minute video: https://www.[employer].com/watch?v=rqoxYKtEWEc " "    Patient to return in 6-8 months, for in-person visit, 30 minutes.     Ofelia Batista DO  Movement Disorders Specialist  [facility]     Note dictated using voice recognition software.   30 minutes spent on date of encounter doing chart reviews and exam and documentation and further activities as noted above.        ------------------------------------------------------------------------------------------------------------      History of Present Illness  Ms. Rock is a pleasant 68 year old female that presents to Neurology Movement clinic for follow up regarding Essential Tremor.   The patient was initially seen in neurologic consultation on 1/18/23 and most recently 1/26/24 for management of tremor. Please see the comprehensive neurologic consultation notes from those dates in the Epic records for details.      History obtained from patient. Patient is accompanied by ANAHY Abdullahi.  Main complaint: tremor  Patient reports \"this is the best she has been\" in regard to her tremor. Head and hands still shake but this is not bothersome. No issues with eating; \"It makes it in there.\" She is able to cut her food now. She is able to do laundry and use a drill.   Primidone benefits: Improved   Side effects of primidone: none   She drinks decaffeinated coffee.   Triggers for tremors: stress  Anxiety: Treated with Buspirone 10 mg and fluoxetine 20 mg daily. She is currently very stressed with personal issues regarding her son and family. Kaylen is a great support system.   Movement Disorder-related Medications                   Indication At bedtime        Primidone 50 mg ET 1                                      Review of Systems:  Other than that mentioned above, the remainder of 12 systems reviewed were negative.    PMH: unchanged  PSH: unchanged  FH: unchanged  SH: unchanged    Medications:  Current Outpatient Medications   Medication Sig Dispense Refill    ALPRAZolam (XANAX) 1 MG tablet " [ALPRAZOLAM (XANAX) 1 MG TABLET] As needed      atorvastatin (LIPITOR) 20 MG tablet [ATORVASTATIN (LIPITOR) 20 MG TABLET] Take 20 mg by mouth daily.       busPIRone (BUSPAR) 10 MG tablet [BUSPIRONE (BUSPAR) 10 MG TABLET] TAKE 1 TABLET BY MOUTH TWICE DAILY FOR ANXIETY      FLUoxetine (PROZAC) 20 MG capsule Take 20 mg by mouth daily      primidone (MYSOLINE) 50 MG tablet Start primidone 50 mg by taking 0.5 tab x 2 week, then increase to a full tab. 30 tablet 11    vitamin D3 (CHOLECALCIFEROL) 50 mcg (2000 units) tablet Take 1 tablet by mouth daily             Allergies   Allergen Reactions    Shellfish Derived [Shellfish-Derived Products] Anaphylaxis    Phenytoin Sodium Extended [Phenytoin] Other (See Comments)     Swelling and skin peeling in hands and feet          Physical Exam:  The patient's  blood pressure is 104/67 and her pulse is 77.        5/8/2024    12:00 PM   Tremor Motor Scale   Medication On   DBS - Right Brain Off   DBS - Left Brain Off   Head 1.5   Face & Jaw 0   Voice 0   Outstretched - RIGHT 1   Outstretched - LEFT 1   Wingbeating - RIGHT 1.5   Wingbeating - LEFT 1   Kinetic - RIGHT 1.5   Kinetic - LEFT 1   Lower Limb - RIGHT 0   Lower Limb - LEFT 1   Lower Limb (Max) 1   Trunk (Standing) 0   Axial 1.5       Data Reviewed: I have personally reviewed the tests/studies below.       Lab Results   Component Value Date    A1C 5.4 01/18/2023     TSH   Date Value Ref Range Status   06/30/2023 1.17 0.30 - 4.20 uIU/mL Final   03/10/2021 1.24 0.30 - 5.00 uIU/mL Final     CBC RESULTS:   Recent Labs   Lab Test 09/26/23  0902   WBC 4.9   RBC 4.46   HGB 14.1   HCT 44.6      MCH 31.6   MCHC 31.6   RDW 12.6        Last Comprehensive Metabolic Panel:  Sodium   Date Value Ref Range Status   09/26/2023 139 136 - 145 mmol/L Final     Potassium   Date Value Ref Range Status   09/26/2023 3.8 3.4 - 5.3 mmol/L Final   01/18/2023 4.3 3.5 - 5.0 mmol/L Final     Chloride   Date Value Ref Range Status    09/26/2023 103 98 - 107 mmol/L Final   01/18/2023 103 98 - 107 mmol/L Final     Carbon Dioxide (CO2)   Date Value Ref Range Status   09/26/2023 28 22 - 29 mmol/L Final   01/18/2023 27 22 - 31 mmol/L Final     Anion Gap   Date Value Ref Range Status   09/26/2023 8 7 - 15 mmol/L Final   01/18/2023 9 5 - 18 mmol/L Final     Glucose   Date Value Ref Range Status   09/26/2023 104 (H) 70 - 99 mg/dL Final   01/18/2023 88 70 - 125 mg/dL Final     Urea Nitrogen   Date Value Ref Range Status   09/26/2023 11.2 8.0 - 23.0 mg/dL Final   01/18/2023 14 8 - 22 mg/dL Final     Creatinine   Date Value Ref Range Status   09/26/2023 0.89 0.51 - 0.95 mg/dL Final     GFR Estimate   Date Value Ref Range Status   09/26/2023 71 >60 mL/min/1.73m2 Final   04/01/2021 >60 >60 mL/min/1.73m2 Final     Calcium   Date Value Ref Range Status   09/26/2023 9.1 8.8 - 10.2 mg/dL Final     Bilirubin Total   Date Value Ref Range Status   09/13/2023 0.3 <=1.2 mg/dL Final     Alkaline Phosphatase   Date Value Ref Range Status   09/13/2023 116 (H) 35 - 104 U/L Final     ALT   Date Value Ref Range Status   09/13/2023 50 0 - 50 U/L Final     Comment:     Reference intervals for this test were updated on 6/12/2023 to more accurately reflect our healthy population. There may be differences in the flagging of prior results with similar values performed with this method. Interpretation of those prior results can be made in the context of the updated reference intervals.       AST   Date Value Ref Range Status   09/13/2023 37 0 - 45 U/L Final     Comment:     Reference intervals for this test were updated on 6/12/2023 to more accurately reflect our healthy population. There may be differences in the flagging of prior results with similar values performed with this method. Interpretation of those prior results can be made in the context of the updated reference intervals.

## 2024-05-08 NOTE — PATIENT INSTRUCTIONS
"Plan:   - No changes to primidone 50 mg 1 tab daily   - Discussed neurotoxin injections if head tremors become bothersome.   - Consider trying guided meditation. Here are some apps I recommend:.     - Headspace nahum (www.headspace.com): 14 day free trial and then there is a monthly or annual rate  - Calm nahum (www.calm.com): 7 day free trial and then there is an annual rate  - InsightTimer nahum (www.insighttimer.com): FREE     - Knowing how to relax is not something our society teaches. In fact, it teaches us to do the opposite. The relaxation response was developed by the [place]  and physician Dr. Jalen Kincaid. You can google both \"Jalen Kincaid\" and \"Relaxation Response\" to find a multitude of information. Breath work is the center of this process and can reduce anxiety and stress markedly.     Dr. Kincaid talks about the need for four things:     1. A quiet environment   2. A comfortable position    3. A passive mental attitude   4. A mental device    The passive mental attitude means that when distracting thoughts come into your mind while deep breathing, you do not  them. Instead of saying to yourself, \"Darn, I just can't calm my mind,\" say, \"Oh, that was interesting,\" and then refocus on the breath. Our brains are a complex network of connections that were meant to fire. It takes practice, lots of practice to focus and breathe. Try not to  your thoughts as good or bad, they just are.     The mental device can be just about anything. It can be meditation, prayer, saying one sound out loud, whatever you want it to be. The key is to breathe from your belly. Place your hand on your belly. As you inhale to the count of 3 through your nose, feel your hand rising. This helps to remind you to get the breath all the way down into the belly. Exhale to the count of 6 (or double whatever the count of the inhale) through pursed lips. By keeping your lips pursed, the exhale lasts longer and decreases the " loss of carbon dioxide. When we breathe fast from our chest, we lose too much carbon dioxide which changes the chemistry of the body. This is what causes many of the physical symptoms of anxiety (tremor, lightheadedness, fast heart rate, ringing in the ear, feeling of unreality).     So, inhale through your nose, think 'All is well,' Exhale through pursed lips, thinking 'This and every day'. Do this twice a day, first thing in the morning and before dinner in the evening. Do it for a minimum of 10 minutes, a maximum of 20 minutes (Dr. Jalen Kincaid's Relaxation Response). These two phrases are just suggestions. Change it to whatever resonates with you.     Remember, practice this daily, so that you will have this skill when you need it most. Please watch this short 2 minute video: https://www.[employer].com/watch?v=[email or web address]     Patient to return in 6-8 months, for in-person visit, 30 minutes.

## 2024-05-08 NOTE — NURSING NOTE
Chief Complaint   Patient presents with    Follow Up       LANI Bustamante on 5/8/2024 at 12:22 PM

## 2024-08-20 ENCOUNTER — MYC MEDICAL ADVICE (OUTPATIENT)
Dept: NEUROLOGY | Facility: CLINIC | Age: 69
End: 2024-08-20
Payer: MEDICARE

## 2024-08-20 DIAGNOSIS — G25.0 ESSENTIAL TREMOR: Primary | ICD-10-CM

## 2024-08-21 RX ORDER — PRIMIDONE 50 MG/1
TABLET ORAL
Qty: 60 TABLET | Refills: 11 | Status: SHIPPED | OUTPATIENT
Start: 2024-08-21

## 2024-11-19 NOTE — PROGRESS NOTES
Thank you, Dr. Toussaint, for asking the [facility] team to see Ms. Damon Rock to evaluate 14 months post SVT ablation.    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     Damon Rock is a 67 year old female with supraventricular tachycardia, essential tremor         Arrhythmia hx  Dx/date: Mrs. Rock notes episodes of palpitations since and was diagnosed with SVT (reported to be atrial flutter - ECG shows short RP NCT 131bpm) 3/7/2021.  She has thereafter had approximately 4 episodes per year, though had noted 3 episodes in 6/2023.  She had an ER visit 6/30/2023 with note of short RP NCT 140bpm - this terminated spontaneously to sinus rhythm.    Sx: She denies chest pain, syncope.    Prior AAD, AV darci blocking agents: She has taken metoprolol as needed, usually with episode termination approximately 2 hours thereafter.  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  She has been maintaining normal sinus rhythm since her ablation 14 months ago.  No recurrence of SVT reported.  Denies any recent chest pain, palpitations, shortness of breath on exertion at rest, fatigue, dizziness or near syncope.  She remains off active membrane antiarrhythmic or AV slowing  medications, she is prone to hypotension.  Ventricular rates are controlled in the 70s today.  Blood pressure 90/60.  She does not perform any interval ECG monitoring by way of smart watch or PAYMEY mobile device, she feels this would trigger her anxiety for her. Anxiety managed with triple therapy Rx's. Essential tremors are currently being managed by neurology clinic by use of primidone.  Not a candidate for propranolol use due to her hypotension history.           Cardiographics (reviewed):  EKG  9/26/23 sinus bradycardia with PACs rate 58 bpm QT/QTC: 474/465 ms  6/30/2023 - SR 64bpm.  No evidence of ventricular pre-excitation, normal QTc, no epsilon waves, no Brugada pattern.  6/30/2023 - short RP NCT 119bpm  6/30/2023 - short RP NCT 142bpm    3/10/2021 - SR 57bpm  3/7/2021 - short RP NCT 131bpm       7/26/2023 TTE  1.Left ventricular size, wall motion and function are normal. The ejection  fraction is 60-65%.  2.Normal right ventricle size and systolic function.  3.The aortic valve is trileaflet. There is mild (1+) aortic regurgitation.  Decel 1/2 time is 528 msec.  4.Normal size ascending aorta.  There is no comparison study available               Problem List:  Patient Active Problem List   Diagnosis    Paroxysmal atrial flutter (H)    AVNRT (AV darci re-entry tachycardia) (H)    Arthritis of hand    Breast lesion    Dilation of thoracic aorta (H)    Generalized anxiety disorder    Insomnia    Major depressive disorder, single episode, in full remission (H)    Mixed hyperlipidemia    Nicotine dependence, cigarettes, in remission    Other chronic pain    Primary osteoarthritis involving multiple joints    SVT (supraventricular tachycardia) (H)    Tremor, essential    Atrial flutter (H)    S/P ablation operation for arrhythmia     Revi  e  Physical Examination Review of Systems   w [facility]  There were no vitals taken for this visit.  There is no height or weight on file to calculate BMI.  Wt Readings from Last  3 Encounters:   11/07/23 73 kg (161 lb)   09/26/23 72.6 kg (160 lb)   07/26/23 72.1 kg (159 lb)     General Appearance:   Alert, well-appearing and in no acute distress.   HEENT: Atraumatic, normocephalic.  No scleral icterus, normal conjunctivae; mucous membranes pink and moist.     Chest: Chest symmetric, spine straight.   Lungs:   Respirations unlabored: Lungs are clear to auscultation.   Cardiovascular:   Normal first and second heart sounds with no murmurs, rubs, or gallops.  Regular, regular.   Normal JVD, no edema.       Extremities: No cyanosis or clubbing   Musculoskeletal: Moves all extremities   Skin: Warm, dry, intact.    Neurologic: Mood and affect are appropriate, alert and oriented to person, place, time, and situation     ROS: 10 point ROS neg other than the symptoms noted above in the HPI.     Medical History  Surgical History Family History Social History     No past medical history on file. Past Surgical History:   Procedure Laterality Date    EP ABLATION SVT N/A 9/26/2023    Procedure: Ablation Supraventricular Tachycardia;  Surgeon: Flavia Toussaint MD;  [facility]: [facility] CV    Family History   Problem Relation Age of Onset    Heart Failure Mother 70.00    History   Smoking Status    Former    Types: Cigarettes   Smokeless Tobacco    Never     Social History    Substance and Sexual Activity      Alcohol use: Yes        Comment: 2 monthly       Medications  Allergies     Current Outpatient Medications   Medication Sig Dispense Refill    ALPRAZolam (XANAX) 1 MG tablet [ALPRAZOLAM (XANAX) 1 MG TABLET] As needed      atorvastatin (LIPITOR) 20 MG tablet [ATORVASTATIN (LIPITOR) 20 MG TABLET] Take 20 mg by mouth daily.       busPIRone (BUSPAR) 10 MG tablet [BUSPIRONE (BUSPAR) 10 MG TABLET] TAKE 1 TABLET BY MOUTH TWICE DAILY FOR ANXIETY      FLUoxetine (PROZAC) 20 MG capsule Take 20 mg by mouth daily      primidone (MYSOLINE) 50 MG tablet Take 1.5 tabs at bedtime for a week then  "increase to 2 tabs at bedtime. 60 tablet 11    vitamin D3 (CHOLECALCIFEROL) 50 mcg (2000 units) tablet Take 1 tablet by mouth daily        Allergies   Allergen Reactions    Shellfish Derived [Shellfish-Derived Products] Anaphylaxis    Phenytoin Sodium Extended [Phenytoin] Other (See Comments)     Swelling and skin peeling in hands and feet      Medical, surgical, family, social history, and medications were all reviewed and updated as necessary.   Lab Results    Chemistry/lipid CBC Cardiac Enzymes/BNP/TSH/INR   Recent Labs   Lab Test 09/13/23  1334   CHOL 198   HDL 67   *   TRIG 148     Recent Labs   Lab Test 09/13/23  1334 07/18/22  1533 04/01/21  1356   * 80 87     Recent Labs   Lab Test 09/26/23  0902      POTASSIUM 3.8   CHLORIDE 103   CO2 28   *   BUN 11.2   CR 0.89   GFRESTIMATED 71   SASHA 9.1     Recent Labs   Lab Test 09/26/23  0902 09/13/23  1334 06/30/23  0913   CR 0.89 0.86 0.80     Recent Labs   Lab Test 01/18/23  0941   A1C 5.4          Recent Labs   Lab Test 09/26/23  0902   WBC 4.9   HGB 14.1   HCT 44.6           Recent Labs   Lab Test 09/26/23  0902 09/13/23  1334 06/30/23  0913   HGB 14.1 15.0 15.4    No results for input(s): \"TROPONINI\" in the last 30756 hours.  Recent Labs   Lab Test 06/30/23  0913   NTBNPI 165     Recent Labs   Lab Test 06/30/23  0913   TSH 1.17     Recent Labs   Lab Test 06/30/23  0913   INR 0.94          Total Time- 17 minutes spent on date of encounter doing chart review, history and exam, documentation and further activities as noted above.  This note has been dictated using voice recognition software. Any grammatical, typographical, or context distortions are unintentional and inherent to the software.    Ofelia Kingston [facility]  [phone]                       "

## 2024-11-20 ENCOUNTER — OFFICE VISIT (OUTPATIENT)
Dept: CARDIOLOGY | Facility: CLINIC | Age: 69
End: 2024-11-20
Payer: COMMERCIAL

## 2024-11-20 VITALS
DIASTOLIC BLOOD PRESSURE: 60 MMHG | WEIGHT: 161.1 LBS | RESPIRATION RATE: 20 BRPM | SYSTOLIC BLOOD PRESSURE: 98 MMHG | BODY MASS INDEX: 29.47 KG/M2 | HEART RATE: 78 BPM | OXYGEN SATURATION: 93 %

## 2024-11-20 DIAGNOSIS — Z86.79 S/P ABLATION OPERATION FOR ARRHYTHMIA: ICD-10-CM

## 2024-11-20 DIAGNOSIS — I47.10 SVT (SUPRAVENTRICULAR TACHYCARDIA) (H): Primary | ICD-10-CM

## 2024-11-20 DIAGNOSIS — Z98.890 S/P ABLATION OPERATION FOR ARRHYTHMIA: ICD-10-CM

## 2024-11-20 NOTE — PATIENT INSTRUCTIONS
Damon Rock,    It was a pleasure to see you today at the [facility].     My recommendations after this visit include:    No medication changes today  Follow up in 1 year (2 year post ablation visit)      Ofelia Kingston CNP  [facility], Electrophysiology  [phone]  EP nurses [phone]

## 2024-11-20 NOTE — LETTER
11/20/2024    Ari Jesus MD  [address]  [place] [address]    RE: Damon GOSS [facility]       Dear Colleague,     I had the pleasure of seeing Damon Rock in the [facility].    Thank you, Dr. Toussaint, for asking the [facility] team to see Ms. Damon Rock to evaluate 14 months post SVT ablation.    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     Damon Rock is a 67 year old female with supraventricular tachycardia, essential tremor         Arrhythmia hx  Dx/date: Mrs. Rock notes episodes of palpitations since and was diagnosed with SVT (reported to be atrial flutter - ECG shows short RP NCT 131bpm) 3/7/2021.  She has thereafter had approximately 4 episodes per year, though had noted 3 episodes in 6/2023.  She had an ER visit 6/30/2023 with note of short RP NCT 140bpm - this terminated spontaneously to sinus rhythm.    Sx: She denies chest pain, syncope.    Prior AAD, AV darci blocking agents: She has taken metoprolol as needed, usually with episode termination approximately 2 hours thereafter.  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  She has been maintaining normal sinus rhythm since her ablation  14 months ago.  No recurrence of SVT reported.  Denies any recent chest pain, palpitations, shortness of breath on exertion at rest, fatigue, dizziness or near syncope.  She remains off active membrane antiarrhythmic or AV slowing medications, she is prone to hypotension.  Ventricular rates are controlled in the 70s today.  Blood pressure 90/60.  She does not perform any interval ECG monitoring by way of smart watch or [employer] mobile device, she feels this would trigger her anxiety for her. Anxiety managed with triple therapy Rx's. Essential tremors are currently being managed by neurology clinic by use of primidone.  Not a candidate for propranolol use due to her hypotension history.           Cardiographics (reviewed):  EKG  9/26/23 sinus bradycardia with PACs rate 58 bpm QT/QTC: 474/465 ms  6/30/2023 - SR 64bpm.  No evidence of ventricular pre-excitation, normal QTc, no epsilon waves, no Brugada pattern.  6/30/2023 - short RP NCT 119bpm  6/30/2023 - short RP NCT 142bpm    3/10/2021 - SR 57bpm  3/7/2021 - short RP NCT 131bpm       7/26/2023 TTE  1.Left ventricular size, wall motion and function are normal. The ejection  fraction is 60-65%.  2.Normal right ventricle size and systolic function.  3.The aortic valve is trileaflet. There is mild (1+) aortic regurgitation.  Decel 1/2 time is 528 msec.  4.Normal size ascending aorta.  There is no comparison study available               Problem List:  Patient Active Problem List   Diagnosis     Paroxysmal atrial flutter (H)     AVNRT (AV darci re-entry tachycardia) (H)     Arthritis of hand     Breast lesion     Dilation of thoracic aorta (H)     Generalized anxiety disorder     Insomnia     Major depressive disorder, single episode, in full remission (H)     Mixed hyperlipidemia     Nicotine dependence, cigarettes, in remission     Other chronic pain     Primary osteoarthritis involving multiple joints     SVT (supraventricular tachycardia) (H)     Tremor, essential      Atrial flutter (H)     S/P ablation operation for arrhythmia     Revi  e  Physical Examination Review of Systems   w [facility]  There were no vitals taken for this visit.  There is no height or weight on file to calculate BMI.  Wt Readings from Last 3 Encounters:   11/07/23 73 kg (161 lb)   09/26/23 72.6 kg (160 lb)   07/26/23 72.1 kg (159 lb)     General Appearance:   Alert, well-appearing and in no acute distress.   HEENT: Atraumatic, normocephalic.  No scleral icterus, normal conjunctivae; mucous membranes pink and moist.     Chest: Chest symmetric, spine straight.   Lungs:   Respirations unlabored: Lungs are clear to auscultation.   Cardiovascular:   Normal first and second heart sounds with no murmurs, rubs, or gallops.  Regular, regular.   Normal JVD, no edema.       Extremities: No cyanosis or clubbing   Musculoskeletal: Moves all extremities   Skin: Warm, dry, intact.    Neurologic: Mood and affect are appropriate, alert and oriented to person, place, time, and situation     ROS: 10 point ROS neg other than the symptoms noted above in the HPI.     Medical History  Surgical History Family History Social History     No past medical history on file. Past Surgical History:   Procedure Laterality Date     EP ABLATION SVT N/A 9/26/2023    Procedure: Ablation Supraventricular Tachycardia;  Surgeon: Flavia Toussaint MD;  Location: [facility]    Family History   Problem Relation Age of Onset     Heart Failure Mother 70.00    History   Smoking Status     Former     Types: Cigarettes   Smokeless Tobacco     Never     Social History    Substance and Sexual Activity      Alcohol use: Yes        Comment: 2 monthly       Medications  Allergies     Current Outpatient Medications   Medication Sig Dispense Refill     ALPRAZolam (XANAX) 1 MG tablet [ALPRAZOLAM (XANAX) 1 MG TABLET] As needed       atorvastatin (LIPITOR) 20 MG tablet [ATORVASTATIN (LIPITOR) 20 MG TABLET] Take 20 mg by mouth daily.        busPIRone  "(BUSPAR) 10 MG tablet [BUSPIRONE (BUSPAR) 10 MG TABLET] TAKE 1 TABLET BY MOUTH TWICE DAILY FOR ANXIETY       FLUoxetine (PROZAC) 20 MG capsule Take 20 mg by mouth daily       primidone (MYSOLINE) 50 MG tablet Take 1.5 tabs at bedtime for a week then increase to 2 tabs at bedtime. 60 tablet 11     vitamin D3 (CHOLECALCIFEROL) 50 mcg (2000 units) tablet Take 1 tablet by mouth daily        Allergies   Allergen Reactions     Shellfish Derived [Shellfish-Derived Products] Anaphylaxis     Phenytoin Sodium Extended [Phenytoin] Other (See Comments)     Swelling and skin peeling in hands and feet      Medical, surgical, family, social history, and medications were all reviewed and updated as necessary.   Lab Results    Chemistry/lipid CBC Cardiac Enzymes/BNP/TSH/INR   Recent Labs   Lab Test 09/13/23  1334   CHOL 198   HDL 67   *   TRIG 148     Recent Labs   Lab Test 09/13/23  1334 07/18/22  1533 04/01/21  1356   * 80 87     Recent Labs   Lab Test 09/26/23  0902      POTASSIUM 3.8   CHLORIDE 103   CO2 28   *   BUN 11.2   CR 0.89   GFRESTIMATED 71   SASHA 9.1     Recent Labs   Lab Test 09/26/23  0902 09/13/23  1334 06/30/23  0913   CR 0.89 0.86 0.80     Recent Labs   Lab Test 01/18/23  0941   A1C 5.4          Recent Labs   Lab Test 09/26/23  0902   WBC 4.9   HGB 14.1   HCT 44.6           Recent Labs   Lab Test 09/26/23  0902 09/13/23  1334 06/30/23  0913   HGB 14.1 15.0 15.4    No results for input(s): \"TROPONINI\" in the last 23530 hours.  Recent Labs   Lab Test 06/30/23  0913   NTBNPI 165     Recent Labs   Lab Test 06/30/23  0913   TSH 1.17     Recent Labs   Lab Test 06/30/23  0913   INR 0.94          Total Time- 17 minutes spent on date of encounter doing chart review, history and exam, documentation and further activities as noted above.  This note has been dictated using voice recognition software. Any grammatical, typographical, or context distortions are unintentional and inherent " to the software.    Ofelia Kingston CNP  [facility]  [phone]                         Thank you for allowing me to participate in the care of your patient.      Sincerely,     Ofelia Kingston NP     [facility]  cc:   Flavia Toussaint MD  [address]  [place] [address]

## 2025-01-24 ENCOUNTER — LAB REQUISITION (OUTPATIENT)
Dept: LAB | Facility: CLINIC | Age: 70
End: 2025-01-24
Payer: MEDICARE

## 2025-01-24 DIAGNOSIS — R25.1 TREMOR, UNSPECIFIED: ICD-10-CM

## 2025-01-24 DIAGNOSIS — E78.2 MIXED HYPERLIPIDEMIA: ICD-10-CM

## 2025-01-24 LAB
BASOPHILS # BLD AUTO: 0 10E3/UL (ref 0–0.2)
BASOPHILS NFR BLD AUTO: 0 %
EOSINOPHIL # BLD AUTO: 0 10E3/UL (ref 0–0.7)
EOSINOPHIL NFR BLD AUTO: 1 %
ERYTHROCYTE [DISTWIDTH] IN BLOOD BY AUTOMATED COUNT: 12.8 % (ref 10–15)
HCT VFR BLD AUTO: 45.4 % (ref 35–47)
HGB BLD-MCNC: 14.7 G/DL (ref 11.7–15.7)
IMM GRANULOCYTES # BLD: 0 10E3/UL
IMM GRANULOCYTES NFR BLD: 0 %
LYMPHOCYTES # BLD AUTO: 0.9 10E3/UL (ref 0.8–5.3)
LYMPHOCYTES NFR BLD AUTO: 17 %
MCH RBC QN AUTO: 31.8 PG (ref 26.5–33)
MCHC RBC AUTO-ENTMCNC: 32.4 G/DL (ref 31.5–36.5)
MCV RBC AUTO: 98 FL (ref 78–100)
MONOCYTES # BLD AUTO: 0.6 10E3/UL (ref 0–1.3)
MONOCYTES NFR BLD AUTO: 12 %
NEUTROPHILS # BLD AUTO: 3.6 10E3/UL (ref 1.6–8.3)
NEUTROPHILS NFR BLD AUTO: 69 %
NRBC # BLD AUTO: 0 10E3/UL
NRBC BLD AUTO-RTO: 0 /100
PLATELET # BLD AUTO: 279 10E3/UL (ref 150–450)
RBC # BLD AUTO: 4.62 10E6/UL (ref 3.8–5.2)
WBC # BLD AUTO: 5.2 10E3/UL (ref 4–11)

## 2025-01-24 PROCEDURE — 80053 COMPREHEN METABOLIC PANEL: CPT | Mod: ORL | Performed by: FAMILY MEDICINE

## 2025-01-24 PROCEDURE — 85025 COMPLETE CBC W/AUTO DIFF WBC: CPT | Mod: ORL | Performed by: FAMILY MEDICINE

## 2025-01-24 PROCEDURE — 80061 LIPID PANEL: CPT | Mod: ORL | Performed by: FAMILY MEDICINE

## 2025-01-25 LAB
ALBUMIN SERPL BCG-MCNC: 4.2 G/DL (ref 3.5–5.2)
ALP SERPL-CCNC: 149 U/L (ref 40–150)
ALT SERPL W P-5'-P-CCNC: 34 U/L (ref 0–50)
ANION GAP SERPL CALCULATED.3IONS-SCNC: 12 MMOL/L (ref 7–15)
AST SERPL W P-5'-P-CCNC: 30 U/L (ref 0–45)
BILIRUB SERPL-MCNC: 0.5 MG/DL
BUN SERPL-MCNC: 11 MG/DL (ref 8–23)
CALCIUM SERPL-MCNC: 9.5 MG/DL (ref 8.8–10.4)
CHLORIDE SERPL-SCNC: 101 MMOL/L (ref 98–107)
CHOLEST SERPL-MCNC: 176 MG/DL
CREAT SERPL-MCNC: 0.79 MG/DL (ref 0.51–0.95)
EGFRCR SERPLBLD CKD-EPI 2021: 81 ML/MIN/1.73M2
FASTING STATUS PATIENT QL REPORTED: NORMAL
FASTING STATUS PATIENT QL REPORTED: NORMAL
GLUCOSE SERPL-MCNC: 96 MG/DL (ref 70–99)
HCO3 SERPL-SCNC: 24 MMOL/L (ref 22–29)
HDLC SERPL-MCNC: 80 MG/DL
LDLC SERPL CALC-MCNC: 74 MG/DL
NONHDLC SERPL-MCNC: 96 MG/DL
POTASSIUM SERPL-SCNC: 4.2 MMOL/L (ref 3.4–5.3)
PROT SERPL-MCNC: 7.6 G/DL (ref 6.4–8.3)
SODIUM SERPL-SCNC: 137 MMOL/L (ref 135–145)
TRIGL SERPL-MCNC: 111 MG/DL

## (undated) DEVICE — PATCH CARTO 3 EXTERNAL REFERENCE 3D MAPPING CREFP6

## (undated) DEVICE — INTRO SHEATH 7FRX10CM PINNACLE RSS702

## (undated) DEVICE — TRANSDUCER TRAY ARTERIAL 42646-06

## (undated) DEVICE — CATH EP DECAPOLAR CS 7FR BI-DIRECTL FJ

## (undated) DEVICE — CATH QUADPOLAR 6FR D6DR005RT

## (undated) DEVICE — CATH THERMOCOOL SMARTTOUCH SF FJ CURVE

## (undated) DEVICE — CUSTOM PACK EP

## (undated) DEVICE — ELECTRODE DEFIB CADENCE 22550R

## (undated) DEVICE — INTRO SHEATH 8FRX10CM PINNACLE RSS802

## (undated) DEVICE — TUBE SET SMARKABLATE IRRIGATION

## (undated) RX ORDER — FENTANYL CITRATE 50 UG/ML
INJECTION, SOLUTION INTRAMUSCULAR; INTRAVENOUS
Status: DISPENSED
Start: 2023-09-26

## (undated) RX ORDER — DEXAMETHASONE SODIUM PHOSPHATE 10 MG/ML
INJECTION, SOLUTION INTRAMUSCULAR; INTRAVENOUS
Status: DISPENSED
Start: 2023-09-26

## (undated) RX ORDER — PROPOFOL 10 MG/ML
INJECTION, EMULSION INTRAVENOUS
Status: DISPENSED
Start: 2023-09-26

## (undated) RX ORDER — HEPARIN SODIUM 1000 [USP'U]/ML
INJECTION, SOLUTION INTRAVENOUS; SUBCUTANEOUS
Status: DISPENSED
Start: 2023-09-26

## (undated) RX ORDER — PROTAMINE SULFATE 10 MG/ML
INJECTION, SOLUTION INTRAVENOUS
Status: DISPENSED
Start: 2023-09-26

## (undated) RX ORDER — LIDOCAINE HYDROCHLORIDE 10 MG/ML
INJECTION, SOLUTION EPIDURAL; INFILTRATION; INTRACAUDAL; PERINEURAL
Status: DISPENSED
Start: 2023-09-26